# Patient Record
Sex: FEMALE
[De-identification: names, ages, dates, MRNs, and addresses within clinical notes are randomized per-mention and may not be internally consistent; named-entity substitution may affect disease eponyms.]

---

## 2018-03-21 ENCOUNTER — HOSPITAL ENCOUNTER (INPATIENT)
Dept: HOSPITAL 31 - C.ER | Age: 38
LOS: 4 days | Discharge: HOME | DRG: 871 | End: 2018-03-25
Attending: INTERNAL MEDICINE | Admitting: INTERNAL MEDICINE
Payer: COMMERCIAL

## 2018-03-21 DIAGNOSIS — G40.209: ICD-10-CM

## 2018-03-21 DIAGNOSIS — R79.89: ICD-10-CM

## 2018-03-21 DIAGNOSIS — E87.2: ICD-10-CM

## 2018-03-21 DIAGNOSIS — R65.20: ICD-10-CM

## 2018-03-21 DIAGNOSIS — A41.9: Primary | ICD-10-CM

## 2018-03-21 DIAGNOSIS — S42.251A: ICD-10-CM

## 2018-03-21 DIAGNOSIS — T36.1X5A: ICD-10-CM

## 2018-03-21 DIAGNOSIS — E87.1: ICD-10-CM

## 2018-03-21 DIAGNOSIS — S01.512A: ICD-10-CM
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
## 2018-03-21 DIAGNOSIS — J69.0: ICD-10-CM

## 2018-03-21 DIAGNOSIS — S43.014A: ICD-10-CM

## 2018-03-21 LAB
ALBUMIN SERPL-MCNC: 4.9 G/DL (ref 3.5–5)
ALBUMIN/GLOB SERPL: 1.1 {RATIO} (ref 1–2.1)
ALT SERPL-CCNC: 43 U/L (ref 9–52)
ARTERIAL BLOOD GAS HEMOGLOBIN: 10 G/DL (ref 11.7–17.4)
ARTERIAL BLOOD GAS O2 SAT: 99.5 % (ref 95–98)
ARTERIAL BLOOD GAS PCO2: 33 MM/HG (ref 35–45)
ARTERIAL BLOOD GAS TCO2: 14.2 MMOL/L (ref 22–28)
ARTERIAL PATENCY WRIST A: (no result)
AST SERPL-CCNC: 47 U/L (ref 14–36)
BACTERIA #/AREA URNS HPF: (no result) /[HPF]
BASOPHILS # BLD AUTO: 0.1 K/UL (ref 0–0.2)
BASOPHILS NFR BLD: 0.3 % (ref 0–2)
BILIRUB UR-MCNC: NEGATIVE MG/DL
BUN SERPL-MCNC: 6 MG/DL (ref 7–17)
BUN SERPL-MCNC: 8 MG/DL (ref 7–17)
CALCIUM SERPL-MCNC: 10.2 MG/DL (ref 8.6–10.4)
CALCIUM SERPL-MCNC: 8.4 MG/DL (ref 8.6–10.4)
EOSINOPHIL # BLD AUTO: 0.1 K/UL (ref 0–0.7)
EOSINOPHIL NFR BLD: 0.3 % (ref 0–4)
ERYTHROCYTE [DISTWIDTH] IN BLOOD BY AUTOMATED COUNT: 15 % (ref 11.5–14.5)
GFR NON-AFRICAN AMERICAN: > 60
GFR NON-AFRICAN AMERICAN: > 60
GLUCOSE UR STRIP-MCNC: (no result) MG/DL
GRAN CASTS #/AREA URNS LPF: 7 /LPF (ref 0–1)
HCG,QUALITATIVE URINE: NEGATIVE
HCO3 BLDA-SCNC: 14.3 MMOL/L (ref 21–28)
HGB BLD-MCNC: 13.7 G/DL (ref 11–16)
INHALED O2 CONCENTRATION: 100 %
LEUKOCYTE ESTERASE UR-ACNC: (no result) LEU/UL
LYMPHOCYTES # BLD AUTO: 5 K/UL (ref 1–4.3)
LYMPHOCYTES NFR BLD AUTO: 22.5 % (ref 20–40)
MCH RBC QN AUTO: 28.9 PG (ref 27–31)
MCHC RBC AUTO-ENTMCNC: 32 G/DL (ref 33–37)
MCV RBC AUTO: 90.5 FL (ref 81–99)
MONOCYTES # BLD: 1 K/UL (ref 0–0.8)
MONOCYTES NFR BLD: 4.5 % (ref 0–10)
NEUTROPHILS # BLD: 16 K/UL (ref 1.8–7)
NEUTROPHILS NFR BLD AUTO: 72.4 % (ref 50–75)
NRBC BLD AUTO-RTO: 0 % (ref 0–2)
OSMOLALITY,URINE: 403 MOSM/KG (ref 300–1000)
PH BLDA: 7.21 [PH] (ref 7.35–7.45)
PH UR STRIP: 5 [PH] (ref 5–8)
PLATELET # BLD: 326 K/UL (ref 130–400)
PMV BLD AUTO: 8.9 FL (ref 7.2–11.7)
PO2 BLDA: 277 MM/HG (ref 80–100)
PROT UR STRIP-MCNC: (no result) MG/DL
RBC # BLD AUTO: 4.74 MIL/UL (ref 3.8–5.2)
RBC # UR STRIP: (no result) /UL
SP GR UR STRIP: 1.01 (ref 1–1.03)
UROBILINOGEN UR-MCNC: NORMAL MG/DL (ref 0.2–1)
WBC # BLD AUTO: 22.1 K/UL (ref 4.8–10.8)

## 2018-03-21 PROCEDURE — 0RSJXZZ REPOSITION RIGHT SHOULDER JOINT, EXTERNAL APPROACH: ICD-10-PCS

## 2018-03-21 RX ADMIN — ENOXAPARIN SODIUM SCH MG: 40 INJECTION SUBCUTANEOUS at 16:18

## 2018-03-21 NOTE — CT
EXAM:

  CT Head Without Intravenous Contrast



EXAM DATE/TIME:

  3/21/2018 2:59 AM



CLINICAL HISTORY:

  37 years old, female; Pain; Other: Seizure



TECHNIQUE:

  Axial computed tomography images of the head/brain without intravenous 

contrast.  All CT scans at this facility use one or more dose reduction 

techniques, viz.: automated exposure control; ma/kV adjustment per patient size 

(including targeted exams where dose is matched to indication; i.e. head); or 

iterative reconstruction technique.

  Coronal and sagittal reformatted images were created and reviewed.



COMPARISON:

  No relevant prior studies available.



FINDINGS:

  LIMITATIONS:  Mild to moderate streak/motion artifact.

  BRAIN:  No definite acute abnormality identified.  No acute hemorrhage seen 

within the brain.  No acute extra-axial fluid collections visualized.  No 

evidence of significant mass effect within the brain. Normal gray-white matter 

differentiation.

  VENTRICLES:  No evidence of significant hydrocephalus.

  BONES/JOINTS:  No acute fractures or other acute bony abnormality noted.

  SOFT TISSUES:  No acute abnormality of the visualized soft tissues is seen.

  SINUSES:  Visualized paranasal sinuses appear clear.

  MASTOID AIR CELLS:  Mastoid air cells appear clear.



IMPRESSION:     

- No acute findings seen within the brain, allowing for motion artifact.

- See above for remaining findings.

## 2018-03-21 NOTE — CT
EXAM:

  CT Abdomen and Pelvis With Intravenous Contrast



EXAM DATE/TIME:

  3/21/2018 4:56 AM



CLINICAL HISTORY:

  37 years old, female; Pain; Abdominal pain; Additional info: Abd pain, 

vomiting



TECHNIQUE:

  Axial computed tomography images of the abdomen and pelvis with intravenous 

contrast.  All CT scans at this facility use one or more dose reduction 

techniques, viz.: automated exposure control; ma/kV adjustment per patient size 

(including targeted exams where dose is matched to indication; i.e. head); or 

iterative reconstruction technique.

  Coronal and sagittal reformatted images were created and reviewed.



CONTRAST:

  100 mL of [id number] administered intravenously.



COMPARISON:

  No relevant prior studies available.



FINDINGS:

  LIMITATIONS:  Mild streak/motion artifact.



 ABDOMEN:

  LIVER:  Fatty infiltration of the liver.

  GALLBLADDER AND BILE DUCTS:  No CT evidence of acute cholecystitis.  No 

evidence of significant biliary ductal dilatation.

  PANCREAS:  No CT evidence of acute pancreatitis.

  SPLEEN:  No acute abnormality of the spleen identified.

  ADRENALS:  No acute abnormality of the adrenal glands identified.

  KIDNEYS AND URETERS:  Left nephrogram has a subtle striated appearance.  No 

evidence of perinephric fluid collection.  No renal stones, hydronephrosis, or 

hydroureter seen.

  STOMACH AND BOWEL:  No acute abnormality of the stomach, small bowel or colon 

identified. No evidence of bowel obstruction.

  APPENDIX:  Appendix is seen, and is within normal limits in appearance.



 PELVIS:

  BLADDER: Small focus of air in the bladder lumen, presumably iatrogenic, such 

as related to recent bladder catheterization.  Recommend clinical correlation.

  REPRODUCTIVE:  Very small 1.6 cm lesion with a thin, enhancing and collapsed 

soft tissue rim in the left ovary. This has an appearance suggestive of a 

recently ruptured/involuting ovarian cyst, such as a corpus luteal cyst.  

Followup pelvic ultrasound as clinically indicated.  No acute abnormality of 

the uterus identified.



 ABDOMEN and PELVIS:

  INTRAPERITONEAL SPACE:  No evidence of free intraperitoneal air or fluid.

  BONES/JOINTS:  No acute fractures or other acute bony abnormality noted.

  SOFT TISSUES:  No acute abnormality of the visualized soft tissues is seen.

  VASCULATURE:  No evidence of abdominal aortic aneurysm. No evidence of 

periaortic hemorrhage.

  LYMPH NODES:  No evidence of diffuse lymphadenopathy.



IMPRESSION:     

- Striated appearance of the left nephrogram, a subtle finding, which could 

represent mild left pyelonephritis. Recommend clinical correlation.

- Otherwise, no evidence of significant acute process.

- See above for remaining findings.

## 2018-03-21 NOTE — RAD
PROCEDURE:  Radiographs of the Right Shoulder



HISTORY:

Pain, fall, deformity



COMPARISON:

No prior.



FINDINGS:



BONES:

There is an acute displaced comminuted fracture in the greater 

tuberosity of the humerus.



JOINTS:

There is near normal glenohumeral alignment. The acromioclavicular 

joint is normal.



SOFT TISSUES:

Normal.



OTHER FINDINGS:

None.



IMPRESSION:

Status post closed reduction, near normal glenohumeral alignment and 

acute comminuted displaced fracture in the greater tuberosity of the 

humerus with

## 2018-03-21 NOTE — CP.PCM.CON
History of Present Illness





- History of Present Illness


History of Present Illness: 





   37 yr old woman, right handed who had a seizure while delivering her daughter

, because, she says, it was a "breech" presentation, in [facility], originally 

residing in [facility], with no pmh, presented to the [facility] ER last night after 

several bouts of vomiting and gi upset. In the ER, the patient had a 

generalzied seizure, and had a prolonged postictal state with arm weakness. 

Patient states that before the seizure, there was no aura, no diaphoresis, and 

that she was started on antiepileptic medications in [place], but stopped taking 

it soon after delivery. She has a healthy 4 year old daughter, and no family 

history of epilepsy or pmh of febrile seizures. There is no history of epilepsy 

as a child, no meningitis, no encephalitis, no head trauma or prolonged period 

of loss of consciousness.  She is able to participate in physical exam and 

history. 





PMH/PSH: as above, IUTD, no other medical problems. 


FH/SH: , lives in [facility]. No tobacco, no etoh. 


All: nkda. 





on exam: 


Normal neurological examination except for left arm weakness. 








Past Patient History





- Past Social History


Smoking Status: Never Smoked





- CARDIAC


Hx Hypertension: No





- MUSCULOSKELETAL/RHEUMATOLOGICAL


Hx Falls: Yes





- GASTROINTESTINAL


Hx Gastritis: Yes





- PSYCHIATRIC


Hx Substance Use: No





- ANESTHESIA


Hx Anesthesia: Yes


Hx Anesthesia Reactions: No


Hx Malignant Hyperthermia: No





Meds


Allergies/Adverse Reactions: 


 Allergies











Allergy/AdvReac Type Severity Reaction Status Date / Time


 


No Known Allergies Allergy   Verified 03/21/18 02:59














- Medications


Medications: 


 Current Medications





Enoxaparin Sodium (Lovenox)  40 mg SC DAILY KARLI


Azithromycin 500 mg/ Sodium (Chloride)  250 mls @ 250 mls/hr IVPB DAILY KARLI


   PRN Reason: Protocol


   Last Admin: 03/21/18 10:44 Dose:  250 mls/hr


Ceftriaxone Sodium 2 gm/ (Sodium Chloride)  100 mls @ 100 mls/hr IVPB Q24H KARLI


   PRN Reason: Protocol


Sodium Chloride (Sodium Chloride 0.9%)  1,000 mls @ 150 mls/hr IV .Q6H40M KARLI


   Last Admin: 03/21/18 07:24 Dose:  150 mls/hr


Levetiracetam 500 mg/ Sodium (Chloride)  105 mls @ 420 mls/hr IVPB Q12H KARLI


Pantoprazole Sodium (Protonix Inj)  40 mg IVP DAILY KARLI


   Last Admin: 03/21/18 10:07 Dose:  40 mg











Results





- Vital Signs


Recent Vital Signs: 


 Last Vital Signs











Temp  98.6 F   03/21/18 12:00


 


Pulse  95 H  03/21/18 11:55


 


Resp  21   03/21/18 11:55


 


BP  104/69   03/21/18 11:55


 


Pulse Ox  100   03/21/18 11:55














- Labs


Result Diagrams: 


 03/21/18 03:16





 03/21/18 08:09


Labs: 


 Laboratory Results - last 24 hr











  03/21/18 03/21/18 03/21/18





  02:59 03:16 03:16


 


WBC   22.1 H 


 


RBC   4.74 


 


Hgb   13.7 


 


Hct   42.9 


 


MCV   90.5 


 


MCH   28.9 


 


MCHC   32.0 L 


 


RDW   15.0 H 


 


Plt Count   326 


 


MPV   8.9 


 


Neut % (Auto)   72.4 


 


Lymph % (Auto)   22.5 


 


Mono % (Auto)   4.5 


 


Eos % (Auto)   0.3 


 


Baso % (Auto)   0.3 


 


Neut # (Auto)   16.0 H 


 


Lymph # (Auto)   5.0 H 


 


Mono # (Auto)   1.0 H 


 


Eos # (Auto)   0.1 


 


Baso # (Auto)   0.1 


 


Puncture Site   


 


pCO2   


 


pO2   


 


HCO3   


 


ABG pH   


 


ABG Total CO2   


 


ABG O2 Saturation   


 


ABG Base Excess   


 


ABG Hemoglobin   


 


ABG Carboxyhemoglobin   


 


POC ABG HHb (Measured)   


 


ABG Methemoglobin   


 


Yaakov Test   


 


A-a O2 Difference   


 


Respiratory Index   


 


Hgb O2 Saturation   


 


Liter Flow   


 


FiO2   


 


Sodium    153 H


 


Potassium    7.1 H*


 


Chloride    113 H


 


Carbon Dioxide    17 L


 


Anion Gap    29 H


 


BUN    8


 


Creatinine    0.9


 


Est GFR ( Amer)    > 60


 


Est GFR (Non-Af Amer)    > 60


 


Random Glucose    260 H


 


Serum Osmolality   


 


Lactic Acid   


 


Calcium    10.2


 


Magnesium   


 


Total Bilirubin    0.7


 


AST    47 H


 


ALT    43


 


Alkaline Phosphatase    125


 


Total Protein    9.2 H


 


Albumin    4.9


 


Globulin    4.3 H


 


Albumin/Globulin Ratio    1.1


 


Urine Color  Straw  


 


Urine Clarity  Clear  


 


Urine pH  5.0  


 


Ur Specific Gravity  1.015  


 


Urine Protein  2+ H  


 


Urine Glucose (UA)  1+  


 


Urine Ketones  Negative  


 


Urine Blood  2+ H  


 


Urine Nitrate  Negative  


 


Urine Bilirubin  Negative  


 


Urine Urobilinogen  Normal  


 


Ur Leukocyte Esterase  Neg  


 


Urine WBC (Auto)  1  


 


Urine RBC (Auto)  15 H  


 


Urine Bacteria  Rare  


 


Granular Casts (Auto)  7  


 


Urine Osmolality   


 


Ur Random Creatinine   


 


Ur Random Sodium   


 


Urine HCG, Qual  Negative  


 


Salicylates   


 


Urine Opiates Screen   


 


Urine Methadone Screen   


 


Ur Barbiturates Screen   


 


Ur Phencyclidine Scrn   


 


Ur Amphetamines Screen   


 


U Benzodiazepines Scrn   


 


U Oth Cocaine Metabols   


 


U Cannabinoids Screen   


 


Alcohol, Quantitative    < 10














  03/21/18 03/21/18 03/21/18





  03:17 04:40 04:40


 


WBC   


 


RBC   


 


Hgb   


 


Hct   


 


MCV   


 


MCH   


 


MCHC   


 


RDW   


 


Plt Count   


 


MPV   


 


Neut % (Auto)   


 


Lymph % (Auto)   


 


Mono % (Auto)   


 


Eos % (Auto)   


 


Baso % (Auto)   


 


Neut # (Auto)   


 


Lymph # (Auto)   


 


Mono # (Auto)   


 


Eos # (Auto)   


 


Baso # (Auto)   


 


Puncture Site   


 


pCO2   


 


pO2   


 


HCO3   


 


ABG pH   


 


ABG Total CO2   


 


ABG O2 Saturation   


 


ABG Base Excess   


 


ABG Hemoglobin   


 


ABG Carboxyhemoglobin   


 


POC ABG HHb (Measured)   


 


ABG Methemoglobin   


 


Yaakov Test   


 


A-a O2 Difference   


 


Respiratory Index   


 


Hgb O2 Saturation   


 


Liter Flow   


 


FiO2   


 


Sodium   


 


Potassium   3.5 L 


 


Chloride   


 


Carbon Dioxide   


 


Anion Gap   


 


BUN   


 


Creatinine   


 


Est GFR ( Amer)   


 


Est GFR (Non-Af Amer)   


 


Random Glucose   


 


Serum Osmolality   


 


Lactic Acid    4.1 H*


 


Calcium   


 


Magnesium   2.3 


 


Total Bilirubin   


 


AST   


 


ALT   


 


Alkaline Phosphatase   


 


Total Protein   


 


Albumin   


 


Globulin   


 


Albumin/Globulin Ratio   


 


Urine Color   


 


Urine Clarity   


 


Urine pH   


 


Ur Specific Gravity   


 


Urine Protein   


 


Urine Glucose (UA)   


 


Urine Ketones   


 


Urine Blood   


 


Urine Nitrate   


 


Urine Bilirubin   


 


Urine Urobilinogen   


 


Ur Leukocyte Esterase   


 


Urine WBC (Auto)   


 


Urine RBC (Auto)   


 


Urine Bacteria   


 


Granular Casts (Auto)   


 


Urine Osmolality   


 


Ur Random Creatinine   


 


Ur Random Sodium   


 


Urine HCG, Qual   


 


Salicylates   


 


Urine Opiates Screen  Negative  


 


Urine Methadone Screen  No result  


 


Ur Barbiturates Screen  Negative  


 


Ur Phencyclidine Scrn  Negative  


 


Ur Amphetamines Screen  Negative  


 


U Benzodiazepines Scrn  Negative  


 


U Oth Cocaine Metabols  Negative  


 


U Cannabinoids Screen  Negative  


 


Alcohol, Quantitative   














  03/21/18 03/21/18 03/21/18





  04:50 07:57 08:09


 


WBC   


 


RBC   


 


Hgb   


 


Hct   


 


MCV   


 


MCH   


 


MCHC   


 


RDW   


 


Plt Count   


 


MPV   


 


Neut % (Auto)   


 


Lymph % (Auto)   


 


Mono % (Auto)   


 


Eos % (Auto)   


 


Baso % (Auto)   


 


Neut # (Auto)   


 


Lymph # (Auto)   


 


Mono # (Auto)   


 


Eos # (Auto)   


 


Baso # (Auto)   


 


Puncture Site  Lr  


 


pCO2  33 L  


 


pO2  277 H  


 


HCO3  14.3 L  


 


ABG pH  7.21 L  


 


ABG Total CO2  14.2 L  


 


ABG O2 Saturation  99.5 H  


 


ABG Base Excess  -13.6 L  


 


ABG Hemoglobin  10.0 L  


 


ABG Carboxyhemoglobin  1.1  


 


POC ABG HHb (Measured)  0.5  


 


ABG Methemoglobin  0.8  


 


Yaakov Test  Pos  


 


A-a O2 Difference  395.0  


 


Respiratory Index  1.4  


 


Hgb O2 Saturation  97.6  


 


Liter Flow  15.0  


 


FiO2  100.0  


 


Sodium   


 


Potassium   


 


Chloride   


 


Carbon Dioxide   


 


Anion Gap   


 


BUN   


 


Creatinine   


 


Est GFR ( Amer)   


 


Est GFR (Non-Af Amer)   


 


Random Glucose   


 


Serum Osmolality    292


 


Lactic Acid   


 


Calcium   


 


Magnesium   


 


Total Bilirubin   


 


AST   


 


ALT   


 


Alkaline Phosphatase   


 


Total Protein   


 


Albumin   


 


Globulin   


 


Albumin/Globulin Ratio   


 


Urine Color   


 


Urine Clarity   


 


Urine pH   


 


Ur Specific Gravity   


 


Urine Protein   


 


Urine Glucose (UA)   


 


Urine Ketones   


 


Urine Blood   


 


Urine Nitrate   


 


Urine Bilirubin   


 


Urine Urobilinogen   


 


Ur Leukocyte Esterase   


 


Urine WBC (Auto)   


 


Urine RBC (Auto)   


 


Urine Bacteria   


 


Granular Casts (Auto)   


 


Urine Osmolality   403 


 


Ur Random Creatinine   16.8 


 


Ur Random Sodium   164 


 


Urine HCG, Qual   


 


Salicylates   


 


Urine Opiates Screen   


 


Urine Methadone Screen   


 


Ur Barbiturates Screen   


 


Ur Phencyclidine Scrn   


 


Ur Amphetamines Screen   


 


U Benzodiazepines Scrn   


 


U Oth Cocaine Metabols   


 


U Cannabinoids Screen   


 


Alcohol, Quantitative   














  03/21/18 03/21/18 03/21/18





  08:09 08:09 08:11


 


WBC   


 


RBC   


 


Hgb   


 


Hct   


 


MCV   


 


MCH   


 


MCHC   


 


RDW   


 


Plt Count   


 


MPV   


 


Neut % (Auto)   


 


Lymph % (Auto)   


 


Mono % (Auto)   


 


Eos % (Auto)   


 


Baso % (Auto)   


 


Neut # (Auto)   


 


Lymph # (Auto)   


 


Mono # (Auto)   


 


Eos # (Auto)   


 


Baso # (Auto)   


 


Puncture Site   


 


pCO2   


 


pO2   


 


HCO3   


 


ABG pH   


 


ABG Total CO2   


 


ABG O2 Saturation   


 


ABG Base Excess   


 


ABG Hemoglobin   


 


ABG Carboxyhemoglobin   


 


POC ABG HHb (Measured)   


 


ABG Methemoglobin   


 


Yaakov Test   


 


A-a O2 Difference   


 


Respiratory Index   


 


Hgb O2 Saturation   


 


Liter Flow   


 


FiO2   


 


Sodium  141  


 


Potassium  4.3  


 


Chloride  110 H  


 


Carbon Dioxide  18 L  


 


Anion Gap  17  


 


BUN  6 L  


 


Creatinine  0.7  


 


Est GFR ( Amer)  > 60  


 


Est GFR (Non-Af Amer)  > 60  


 


Random Glucose  73  


 


Serum Osmolality   


 


Lactic Acid   2.6 H 


 


Calcium  8.4 L  


 


Magnesium   


 


Total Bilirubin   


 


AST   


 


ALT   


 


Alkaline Phosphatase   


 


Total Protein   


 


Albumin   


 


Globulin   


 


Albumin/Globulin Ratio   


 


Urine Color   


 


Urine Clarity   


 


Urine pH   


 


Ur Specific Gravity   


 


Urine Protein   


 


Urine Glucose (UA)   


 


Urine Ketones   


 


Urine Blood   


 


Urine Nitrate   


 


Urine Bilirubin   


 


Urine Urobilinogen   


 


Ur Leukocyte Esterase   


 


Urine WBC (Auto)   


 


Urine RBC (Auto)   


 


Urine Bacteria   


 


Granular Casts (Auto)   


 


Urine Osmolality   


 


Ur Random Creatinine   


 


Ur Random Sodium   


 


Urine HCG, Qual   


 


Salicylates    < 1.0


 


Urine Opiates Screen   


 


Urine Methadone Screen   


 


Ur Barbiturates Screen   


 


Ur Phencyclidine Scrn   


 


Ur Amphetamines Screen   


 


U Benzodiazepines Scrn   


 


U Oth Cocaine Metabols   


 


U Cannabinoids Screen   


 


Alcohol, Quantitative   














- Imaging and Cardiology


  ** CT scan - head


Status: Image reviewed by me (Ct head: normal. ), Pending (MRI Brain: per my 

reading, shows some left sided hippocampal atrophy, no intracranial lesions, no 

), Report reviewed by me (MRI Brain: shows ?left hippocampal sclerosis, 

official report pending, no lesions, no strokes. )





Assessment & Plan





- Assessment and Plan (Free Text)


Assessment: 





  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 

seizure last night. She does have some indication of sepsis, but the fact that 

she had a seizure during childbirth which is unusual unless she had preeclampsia

, (which she denies),hints that she needs daily epilepsy medication. I will 

load her with keppra and continue on daily dose, as well as EEG. 





Plan: 


1. Keppra 500 mg bid


2. EEG


3. Official MRI Brain report. 





Thank you for this interesting consult. 


Our team will follow. 


Dr. Marcano

## 2018-03-21 NOTE — C.PDOC
History Of Present Illness





<Quyen Steele - Last Filed: 03/21/18 06:22>





<Anne-Marie Lancaster - Last Filed: 03/21/18 06:54>


37 year old female is brought to the ED by EMS for evaluation of several 

episodes of vomiting, abdominal discomfort since yesterday. As per , 

patient was very weak, fell at home while trying to walk to the bathroom and 

hit her right shoulder PTA. Spouse is unsure of syncope or seizure activity at 

that time and denies observing pt hitting head on dresser. On arrival to the ED

, patient was observed having a grand mal seizure,  denies any prior 

history of seizures. Pt just arrived from [place] 1 week ago (Anne-Marie Lancaster

)





<Quyen Steele - Last Filed: 03/21/18 06:22>


History Per: Patient, Family


History/Exam Limitations: no limitations


Onset/Duration Of Symptoms: Hrs


Current Symptoms Are (Timing): Still Present


Location Of Pain/Discomfort: Diffuse


Radiation Of Pain To:: None


Quality Of Discomfort: "Pain"


Associated Symptoms: denies: Fever


Exacerbating Factors: None


Alleviating Factors: None


Recent travel outside of the [place]: No


Additional History Per: Patient


Abnormal Vaginal Bleeding: No





<Anne-Marie Lancaster - Last Filed: 03/21/18 06:54>


Chief Complaint (Nursing): Abdominal Pain





Past Medical History


Reviewed: Historical Data, Nursing Documentation, Vital Signs





- Medical History


PMH: Gastritis


   Denies: Diabetes, HTN


Surgical History: No Surg Hx


Family History: States: Unknown Family Hx





- Social History


Hx Alcohol Use: No


Hx Substance Use: No





<Anne-Marie Lancaster - Last Filed: 03/21/18 06:54>


Vital Signs: 


 Last Vital Signs











Temp  97.9 F   03/21/18 06:00


 


Pulse  106 H  03/21/18 05:40


 


Resp  20   03/21/18 05:40


 


BP  112/72   03/21/18 05:40


 


Pulse Ox  98   03/21/18 06:49














Review Of Systems


Constitutional: Negative for: Fever, Chills


Cardiovascular: Negative for: Chest Pain


Gastrointestinal: Positive for: Abdominal Pain


Genitourinary: Negative for: Dysuria


Skin: Negative for: Rash


Neurological: Negative for: Weakness, Numbness, Headache





<AnishaAnne-Marie - Last Filed: 03/21/18 06:54>





Physical Exam





- Physical Exam


Appears: Non-toxic, Other (Post ictal)


Skin: Normal Color, Warm, Dry


Head: Atraumatic, Normacephalic


Eye(s): bilateral: Normal Inspection, PERRL, EOMI


Nose: No Discharge


Oral Mucosa: Moist, No Drooling


Tongue: Other (Abrasion to the tip )


Lips: Abrasion (lower )


Neck: Normal ROM, No Midline Cervical Tenderness, Supple


Chest: Symmetrical


Cardiovascular: Rhythm Regular, No Murmur


Respiratory: Normal Breath Sounds, No Rales, No Rhonchi, No Wheezing


Gastrointestinal/Abdominal: Soft, No Tenderness, No Guarding, No Rebound


Extremity: Normal ROM, Tenderness (right anterior shoulder over clavicular area)

, Capillary Refill (< 2 seconds), Deformity, No Swelling


Extremity: Bilateral: Normal Color And Temperature


Pulses: Left Radial: Normal, Right Radial: Normal


Neurological/Psych: Oriented x3, Normal Motor, Normal Sensation


Gait: Steady





<Anne-Marie Lancaster - Last Filed: 03/21/18 06:54>





ED Course And Treatment





- Laboratory Results


Result Diagrams: 


 03/21/18 03:16





 03/21/18 04:40





<Quyen tSeele - Last Filed: 03/21/18 06:22>





- Laboratory Results


Result Diagrams: 


 03/21/18 03:16





 03/21/18 04:40


ECG: Interpreted By Me, Viewed By Me (and Dr Steele)


ECG Rhythm: Sinus Tachycardia (nonspecific ST changes, no ectopy)


O2 Sat by Pulse Oximetry: 98 (On RA)


Pulse Ox Interpretation: Normal





- Radiology


CXR: Interpreted by Me, Viewed By Me


CXR Interpretation: Yes: Infiltrates (right middle lobe)





- Other Rad


  ** Right Shoulder X-Ray


X-Ray: Interpreted by Me, Viewed By Me


Interpretation: Anterior dislocation and glenoid fracture





- CT Scan/US


  ** CT head


Other Rad Studies (CT/US): Read By Radiologist, Radiology Report Reviewed


CT/US Interpretation: EXAM:  CT Head Without Intravenous Contrast.  EXAM DATE/

TIME:  3/21/2018 2:59 AM.  CLINICAL HISTORY:  37 years old, female; Pain; Other

: Seizure.  TECHNIQUE:  Axial computed tomography images of the head/brain 

without intravenous contrast. All CT scans at.  this facility use one or more 

dose reduction techniques, viz.: automated exposure control; ma/kV.  adjustment 

per patient size (including targeted exams where dose is matched to indication; 

i.e. head);.  or iterative reconstruction technique.  Coronal and sagittal 

reformatted images were created and reviewed.  COMPARISON:  No relevant prior 

studies available.  FINDINGS:  LIMITATIONS: Mild to moderate streak/motion 

artifact.  BRAIN: No definite acute abnormality identified. No acute hemorrhage 

seen within the brain. No.  acute extra-axial fluid collections visualized. No 

evidence of significant mass effect within the brain.  Normal gray-white matter 

differentiation.  VENTRICLES: No evidence of significant hydrocephalus.  BONES/

JOINTS: No acute fractures or other acute bony abnormality noted.  SOFT TISSUES

: No acute abnormality of the visualized soft tissues is seen.  SINUSES: 

Visualized paranasal sinuses appear clear.MASTOID AIR CELLS: Mastoid air cells 

appear clear.  IMPRESSION:  - No acute findings seen within the brain, allowing 

for motion artifact.  - See above for remaining findings.  Thank you for 

allowing us to participate in the care of your patient.  Dictated and 

Authenticated by: Marjorie Ordaz MD.  03/21/2018 5:21 AM Eastern Time ([place] & 

Unique)


Progress Note: Plan:  - CT head.  - EKG.  - Labs.  - Ativan 2 mg IVP.  - 

Dilaudid 1 mg PO.  - Morphine 2 mg IVP.  - IV fluids.  - Zofran 4 mg IVP.  - 

Right shoulder X-Ray.  - UA.  Shoulder reduction performed by Dr Steele 

successfully- see procedure note.  05:43 - Dr Steele spoke with Dr. Feliz 

about the case for ICU eval / admission.  06:05 - spoke with Dr. Cameron who 

accepted the patient under his service.  Chest/ abd/ pelvis CT IV ordered, IV 

abx initiated





<Anne-Marie Lancaster - Last Filed: 03/21/18 06:54>





Medical Decision Making





<Quyen Steele - Last Filed: 03/21/18 06:22>





<Anne-Marie Lancaster - Last Filed: 03/21/18 06:54>


Medical Decision Making: 


Performed by Dr. Steele





Films were reviewed and shown anterior shoulder dislocation and glenoid 

fracture. With 's consent patient was given 100 mg propofol. Using axial 

traction shoulder was reduced.





Post reduction X-Ray showed good position, no complications during the process 

conscious sedation, shoulder was placed on immobilizer. (Anne-Marie Lancaster)





Disposition





- Disposition


Disposition Time: 06:25





<Quyen Steele - Last Filed: 03/21/18 06:22>





<Anne-Marie Lancaster - Last Filed: 03/21/18 06:54>





- Disposition


Disposition: HOSPITALIZED


Condition: SERIOUS





- Clinical Impression


Clinical Impression: 


 Sepsis, Pneumonia, Seizures, Altered mental status, Shoulder dislocation








<Quyen Steele - Last Filed: 03/21/18 06:22>





- PA / NP / Resident Statement


MD/DO has reviewed & agrees with the documentation as recorded.





- Scribe Statement


The provider has reviewed the documentation as recorded by the Scribe





<Anne-Marie Lancaster - Last Filed: 03/21/18 06:54>





- Scribe Statement


Can [place]





All medical record entries made by the Scribe were at my direction and 

personally dictated by me. I have reviewed the chart and agree that the record 

accurately reflects my personal performance of the history, physical exam, 

medical decision making, and the department course for this patient. I have 

also personally directed, reviewed, and agree with the discharge instructions 

and disposition. (Anne-Marie Lancaster)





Proc Sedation INTRA-PROCEDURE





<Quyen Steele - Last Filed: 03/21/18 06:22>





<Anne-Marie Lancaster - Last Filed: 03/21/18 06:54>





- Medications


Medications Given: 








Azithromycin 500 mg/ Sodium (Chloride)  250 mls @ 250 mls/hr IVPB DAILY KARLI


   PRN Reason: Protocol


Ceftriaxone Sodium 2 gm/ (Sodium Chloride)  100 mls @ 100 mls/hr IVPB DAILY KARLI


   PRN Reason: Protocol





Discontinued Medications





Calcium Chloride (Calcium Chloride)  1,000 mg IV ONCE ONE


   Stop: 03/21/18 04:28


   Last Admin: 03/21/18 05:14  Dose: 1,000 mg





eMAR Start Stop


 Document     03/21/18 05:14  SAIMA  (Rec: 03/21/18 05:14  SAIMA  [id number])


     Intravenous Solution


      Start Date                                 03/21/18


      Start Time                                 05:13


      End Date                                   03/21/18


      End time                                   05:15


      Total Infusion Time                        2





Dextrose (Dextrose 50% Inj)  50 ml IVP STAT STA


   Stop: 03/21/18 04:31


   Last Admin: 03/21/18 05:26  Dose: 50 ml





IVP Administration


 Document     03/21/18 05:26  SAIMA  (Rec: 03/21/18 05:27  SAIMA  [id number])


     Charges for Administration


      # of IVP Administrations                   1





Hydromorphone HCl (Dilaudid)  1 mg IVP STAT STA


   Stop: 03/21/18 03:13


   Last Admin: 03/21/18 03:15  Dose: 1 mg





MAR Pain Assessment


 Document     03/21/18 03:15  SAIMA  (Rec: 03/21/18 04:34  SAIMA  [id number])


     Pain Reassessment


      Is this a pain reassessment?               Yes


     Sleep


      Is patient sleeping during reassessment?   No


     Presence of Pain


      Presence of Pain                           Yes


     Pain Scale Used


      Pain Scale Used                            Numeric


     Location


      Left, Right or Bilateral                   Right


      Pain Location Body Site                    Shoulder


     Description


      Description                                Constant


      Pain Behavior                              Screaming


IVP Administration


 Document     03/21/18 03:15  SAIMA  (Rec: 03/21/18 04:34  SAIMA  [id number])


     Charges for Administration


      # of IVP Administrations                   1





Sodium Chloride (Sodium Chloride 0.9%)  1,000 mls @ 1,000 mls/hr IV .Q1H ONE


   Stop: 03/21/18 03:58


   Last Admin: 03/21/18 03:03  Dose: 1,000 mls/hr





eMAR Start Stop


 Document     03/21/18 03:03  BAL  (Rec: 03/21/18 03:03  BAL  [id number])


     Intravenous Solution


      Start Date                                 03/21/18


      Start Time                                 03:03


      End Date                                   03/21/18


      End time                                   04:03


      Total Infusion Time                        60





Sodium Chloride (Sodium Chloride 0.9%)  500 mls @ 999 mls/hr IV .Q31M STA


   Stop: 03/21/18 04:56


   Last Admin: 03/21/18 05:13  Dose: 999 mls/hr





eMAR Start Stop


 Document     03/21/18 05:13  SAIMA  (Rec: 03/21/18 05:14  SAIMA  [id number])


     Intravenous Solution


      Start Date                                 03/21/18


      Start Time                                 05:13


      End Date                                   03/21/18


      End time                                   05:45


      Total Infusion Time                        32





Cefoxitin Sodium 2 gm/ Sodium (Chloride)  150 mls @ 150 mls/hr IV STAT STA


   PRN Reason: Protocol


   Stop: 03/21/18 06:34


Sodium Chloride (Sodium Chloride 0.9%)  500 mls @ 999 mls/hr IV .Q31M STA


   Stop: 03/21/18 06:11


   Last Admin: 03/21/18 05:49  Dose: 999 mls/hr





eMAR Start Stop


 Document     03/21/18 05:49  SAIMA  (Rec: 03/21/18 05:49  SAIMA  [id number])


     Intravenous Solution


      Start Date                                 03/21/18


      Start Time                                 05:49


      End Date                                   03/21/18


      End time                                   06:20


      Total Infusion Time                        31





Insulin Human Regular (Novolin R)  10 unit IV ONCE ONE


   Stop: 03/21/18 04:30


   Last Admin: 03/21/18 05:24  Dose: 10 unit





eMAR Start Stop


 Document     03/21/18 05:24  SAIMA  (Rec: 03/21/18 05:26  SAIMA  [id number])


     Intravenous Solution


      Start Date                                 03/21/18


      Start Time                                 05:10


      End Date                                   03/21/18


      End time                                   05:10


      Total Infusion Time                        0





Lorazepam (Ativan)  2 mg IVP ONCE ONE


   Stop: 03/21/18 03:01


   Last Admin: 03/21/18 02:50  Dose: 2 mg





IVP Administration


 Document     03/21/18 02:50  BAL  (Rec: 03/21/18 03:04  BAL  [id number])


     Charges for Administration


      # of IVP Administrations                   1





Morphine Sulfate (Morphine)  2 mg IV ONCE ONE


   Stop: 03/21/18 03:06


   Last Admin: 03/21/18 02:55  Dose: 2 mg





eMAR Start Stop


 Document     03/21/18 02:55  SAIMA  (Rec: 03/21/18 04:35  SAIMA  [id number])


     Intravenous Solution


      Start Date                                 03/21/18


      Start Time                                 02:55


MAR Pain Assessment


 Document     03/21/18 02:55  SAIMA  (Rec: 03/21/18 04:35  SAIMA  [id number])


     Pain Reassessment


      Is this a pain reassessment?               No


     Sleep


      Is patient sleeping during reassessment?   No


     Presence of Pain


      Presence of Pain                           Yes


     Pain Scale Used


      Pain Scale Used                            Numeric


     Location


      Left, Right or Bilateral                   Right


      Pain Location Body Site                    Shoulder


     Description


      Description                                Constant


      Intensity of Pain at present               10


      Pain Behavior                              Screaming





Ondansetron HCl (Zofran Inj)  4 mg IVP ONCE ONE


   Stop: 03/21/18 03:01


   Last Admin: 03/21/18 03:04  Dose: 4 mg





IVP Administration


 Document     03/21/18 03:04  BAL  (Rec: 03/21/18 03:04  BAL  [id number])


     Charges for Administration


      # of IVP Administrations                   1





Propofol (Diprivan)  100 mg IVP ONCE ONE


   Stop: 03/21/18 03:48


   Last Admin: 03/21/18 03:47  Dose: 100 mg





IVP Administration


 Document     03/21/18 03:47  SAIMA  (Rec: 03/21/18 05:13  SAIMA  [id number])


     Charges for Administration


      # of IVP Administrations                   2





Sodium Bicarbonate (Sodium Bicarbonate (8.4%) 50 Meq Syringe)  50 meq IVP ONCE 

ONE


   Stop: 03/21/18 04:28


   Last Admin: 03/21/18 05:23  Dose: 50 meq





IVP Administration


 Document     03/21/18 05:23  SAIMA  (Rec: 03/21/18 05:23  SAIMA  [id number])


     Charges for Administration


      # of IVP Administrations                   1














Proc Sedation PRE-PROCEDURE





<Quyen Steele - Last Filed: 03/21/18 06:22>





- Pre-Anesthesia


Past Medical History: Medications Reviewed, Allergies Reviewed, Record Review


Previous Surgies: Reviewed


Family History/Social History: Reviewed





- Physical Exam/Review of Systems


Vital Signs Reviewed: Yes


Cardiovascular: Regular Rate and Rhythm, Normal S1, S2.  denies: Murmurs


Respiratory/Chest: Clear to Auscultation, Good Air Exchange.  denies: 

Respiratory Distress, Accessory Muscle Use


Neurological: GCS=15, CN II-XII Intact, Speech Normal


Abdomen: Normal Bowel Sounds.  denies: Tenderness, Distention, Peritoneal Signs


Mental Status: Alert and Oriented X 3





- Pre-Procedure Airway Assessment


History of difficult intubation or surgical airway(i.e trach: No


Inability to extend neck:: No


Mouth opening less than two finger breadth:: No


Diagnosis of sleep apnea:: No


Less than three finger breadth to hyoid bone:: No


ASA Criteria: 1 - Healthy, normal.  2 - Mild systemic disease (No functional 

limitations, mildline obesity, DM withot complications, Hypertention).  3 - 

Severe systemic disease (Some functional limitation, stable angina, morbid 

obesity, controlled COPD/Asthma/CHF).  4 - Sever systemic disease constant 

threat to life (Unstable angina, active symptoms of COPD/Asthma, CHF/

Hypertension.  5 - Moribund





<Anne-Marie Lancaster - Last Filed: 03/21/18 06:54>





- Pre-Anesthesia


Chief Complaint: Abdominal Pain





Proc Sedation POST-PROCEDURE





<Quyen Steele - Last Filed: 03/21/18 06:22>





- Discharge Checklist


Written MD order for Discharge: Yes


Vital signs assessed and are consistent with pre-procedure r: Yes


Ambulates to pre-procedural level: Yes


Alert and oriented to pre-procedural level: Yes


Responsible adult escort present: Yes


DISCHARGE INSTRUCTIONS GIVEN:: Yes





<Anne-Marie Lancaster - Last Filed: 03/21/18 06:54>





- Post Procedure Physician Note


Post Procedure Note: 





Films were reviewed and shown anterior shoulder dislocation and glenoid 

fracture. With 's consent patient was given 100 mg propofol. Using axial 

traction shoulder was reduced.





Post reduction X-Ray showed good position, no complications during the process 

conscious sedation, shoulder was placed on immobilizer. (Anne-Marie Lancaster)

## 2018-03-21 NOTE — MRI
PROCEDURE:  MRI BRAIN WITH AND WITHOUT CONTRAST



HISTORY:

s/p seizure



COMPARISON:

Noncontrast head CT from 03/21/2018 



TECHNIQUE:

Multiplanar, multisequence MR images of the brain were obtained with 

and without intravenous contrast enhancement. 15 mL Omniscan was 

injected intravenously. 



FINDINGS:



HEMORRHAGE:

None



DWI:

No evidence of an acute or early subacute infarction.



BRAIN PARENCHYMA:

Gray-white matter differentiation is preserved.  There is no mass, 

mass effect or abnormal extra-axial fluid collection.  There is no 

territorial infarction. The midline sagittal structures are normal.



ENHANCEMENT:

No abnormal intracranial enhancement.



VENTRICLES:

The ventricles are normal in size, shape and configuration.



CRANIUM:

There is normal bone marrow signal pattern.



ORBITS:

Grossly unremarkable.



PARANASAL SINUSES/MASTOIDS:

Predominantly clear.



VASCULAR SYSTEM:

There are normal signal voids in the larger intracranial arteries.



OTHER FINDINGS:

None .



IMPRESSION:

Normal pre and post contrast enhanced MRI of the brain.

## 2018-03-21 NOTE — CP.PCM.HP
<RamboChari L. - Last Filed: 03/21/18 07:04>





History of Present Illness





- History of Present Illness


History of Present Illness: 


CC: vomiting, shaking, shoulder injury





HPI: Patient is a 37 year old female with no significant past medical history 

who moved to the  from [facility] one week ago and who was brought in today for 

vomiting and shaking. Patient had dinner with her  and then started 

having abdominal pain which she thought was gas pain. Patient took a medication 

for gas and about 30 minutes later had 3 episodes of non bloody vomiting. 

Patient was feeling very weak and tired. Patient fell onto a table (dislocating 

her right shoulder) and  noticed some blood from the patient's mouth. At 

that time patient's  tried to open her mouth but her jaw was locked 

close. The patient started shaking for about 3 minutes. 911 was called and 

patient brought to [facility]. As per nurse, patient was screaming in the 

ER in pain due to her right shoulder dislocation. After a few minutes of 

screaming, patient's eyes became fixed and her lower extremities became rigid 

and she started convulsing for about 1 minute. Patient given Ativan and then 

sedated to reduce her shoulder. On exam patient is lethargic. Patient denies 

any recent illnesses. 





PMHx: denies


Psurg: c section in 2014


Famhx: denies


Socialhx: denies tobacco, alcohol, drugs. Patient moved to [facility] one week ago. 


Allergies: NKDA


Home meds: none








Present on Admission





- Present on Admission


Any Indicators Present on Admission: No


History of DVT/PE: No


History of Uncontrolled Diabetes: No


Urinary Catheter: No


Decubitus Ulcer Present: No





Review of Systems





- Review of Systems


Systems not reviewed;Unavailable: Language Barrier





- Gastrointestinal


Gastrointestinal: Abdominal Pain, Nausea, Vomiting





Past Patient History





- Past Social History


Smoking Status: Never Smoked





- CARDIAC


Hx Hypertension: No





- GASTROINTESTINAL


Hx Gastritis: Yes





- PSYCHIATRIC


Hx Substance Use: No





Meds


Allergies/Adverse Reactions: 


 Allergies











Allergy/AdvReac Type Severity Reaction Status Date / Time


 


No Known Allergies Allergy   Verified 03/21/18 02:59














Physical Exam





- Constitutional


Appears: Toxic, No Acute Distress





- Head Exam


Head Exam: ATRAUMATIC, NORMAL INSPECTION, NORMOCEPHALIC





- Eye Exam


Eye Exam: EOMI, Normal appearance





- ENT Exam


ENT Exam: Mucous Membranes Moist





- Respiratory Exam


Respiratory Exam: Clear to Auscultation Bilateral, NORMAL BREATHING PATTERN





- Cardiovascular Exam


Cardiovascular Exam: Tachycardia, REGULAR RHYTHM, +S1, +S2





- GI/Abdominal Exam


GI & Abdominal Exam: Normal Bowel Sounds, Soft.  absent: Tenderness





- Extremities Exam


Extremities exam: Positive for: normal inspection.  Negative for: pedal edema


Additional comments: 





right arm in sling 





- Neurological Exam


Neurological exam: Alert, Oriented x3





- Psychiatric Exam


Psychiatric exam: Normal Affect, Normal Mood





- Skin


Skin Exam: Intact, Normal Color, Warm





Results





- Vital Signs


Recent Vital Signs: 





 Last Vital Signs











Temp  97.9 F   03/21/18 06:00


 


Pulse  106 H  03/21/18 05:40


 


Resp  20   03/21/18 05:40


 


BP  112/72   03/21/18 05:40


 


Pulse Ox  98   03/21/18 06:53














- Labs


Result Diagrams: 


 03/21/18 03:16





 03/21/18 04:40


Labs: 





 Laboratory Results - last 24 hr











  03/21/18 03/21/18 03/21/18





  02:59 03:16 03:16


 


WBC   22.1 H 


 


RBC   4.74 


 


Hgb   13.7 


 


Hct   42.9 


 


MCV   90.5 


 


MCH   28.9 


 


MCHC   32.0 L 


 


RDW   15.0 H 


 


Plt Count   326 


 


MPV   8.9 


 


Neut % (Auto)   72.4 


 


Lymph % (Auto)   22.5 


 


Mono % (Auto)   4.5 


 


Eos % (Auto)   0.3 


 


Baso % (Auto)   0.3 


 


Neut # (Auto)   16.0 H 


 


Lymph # (Auto)   5.0 H 


 


Mono # (Auto)   1.0 H 


 


Eos # (Auto)   0.1 


 


Baso # (Auto)   0.1 


 


Puncture Site   


 


pCO2   


 


pO2   


 


HCO3   


 


ABG pH   


 


ABG Total CO2   


 


ABG O2 Saturation   


 


ABG Base Excess   


 


ABG Hemoglobin   


 


ABG Carboxyhemoglobin   


 


POC ABG HHb (Measured)   


 


ABG Methemoglobin   


 


Yaakov Test   


 


A-a O2 Difference   


 


Respiratory Index   


 


Hgb O2 Saturation   


 


Liter Flow   


 


FiO2   


 


Sodium    153 H


 


Potassium    7.1 H*


 


Chloride    113 H


 


Carbon Dioxide    17 L


 


Anion Gap    29 H


 


BUN    8


 


Creatinine    0.9


 


Est GFR ( Amer)    > 60


 


Est GFR (Non-Af Amer)    > 60


 


Random Glucose    260 H


 


Lactic Acid   


 


Calcium    10.2


 


Magnesium   


 


Total Bilirubin    0.7


 


AST    47 H


 


ALT    43


 


Alkaline Phosphatase    125


 


Total Protein    9.2 H


 


Albumin    4.9


 


Globulin    4.3 H


 


Albumin/Globulin Ratio    1.1


 


Urine Color  Straw  


 


Urine Clarity  Clear  


 


Urine pH  5.0  


 


Ur Specific Gravity  1.015  


 


Urine Protein  2+ H  


 


Urine Glucose (UA)  1+  


 


Urine Ketones  Negative  


 


Urine Blood  2+ H  


 


Urine Nitrate  Negative  


 


Urine Bilirubin  Negative  


 


Urine Urobilinogen  Normal  


 


Ur Leukocyte Esterase  Neg  


 


Urine WBC (Auto)  1  


 


Urine RBC (Auto)  15 H  


 


Urine Bacteria  Rare  


 


Granular Casts (Auto)  7  


 


Urine HCG, Qual  Negative  


 


Urine Opiates Screen   


 


Urine Methadone Screen   


 


Ur Barbiturates Screen   


 


Ur Phencyclidine Scrn   


 


Ur Amphetamines Screen   


 


U Benzodiazepines Scrn   


 


U Oth Cocaine Metabols   


 


U Cannabinoids Screen   


 


Alcohol, Quantitative    < 10














  03/21/18 03/21/18 03/21/18





  03:17 04:40 04:40


 


WBC   


 


RBC   


 


Hgb   


 


Hct   


 


MCV   


 


MCH   


 


MCHC   


 


RDW   


 


Plt Count   


 


MPV   


 


Neut % (Auto)   


 


Lymph % (Auto)   


 


Mono % (Auto)   


 


Eos % (Auto)   


 


Baso % (Auto)   


 


Neut # (Auto)   


 


Lymph # (Auto)   


 


Mono # (Auto)   


 


Eos # (Auto)   


 


Baso # (Auto)   


 


Puncture Site   


 


pCO2   


 


pO2   


 


HCO3   


 


ABG pH   


 


ABG Total CO2   


 


ABG O2 Saturation   


 


ABG Base Excess   


 


ABG Hemoglobin   


 


ABG Carboxyhemoglobin   


 


POC ABG HHb (Measured)   


 


ABG Methemoglobin   


 


Yaakov Test   


 


A-a O2 Difference   


 


Respiratory Index   


 


Hgb O2 Saturation   


 


Liter Flow   


 


FiO2   


 


Sodium   


 


Potassium   3.5 L 


 


Chloride   


 


Carbon Dioxide   


 


Anion Gap   


 


BUN   


 


Creatinine   


 


Est GFR ( Amer)   


 


Est GFR (Non-Af Amer)   


 


Random Glucose   


 


Lactic Acid    4.1 H*


 


Calcium   


 


Magnesium   2.3 


 


Total Bilirubin   


 


AST   


 


ALT   


 


Alkaline Phosphatase   


 


Total Protein   


 


Albumin   


 


Globulin   


 


Albumin/Globulin Ratio   


 


Urine Color   


 


Urine Clarity   


 


Urine pH   


 


Ur Specific Gravity   


 


Urine Protein   


 


Urine Glucose (UA)   


 


Urine Ketones   


 


Urine Blood   


 


Urine Nitrate   


 


Urine Bilirubin   


 


Urine Urobilinogen   


 


Ur Leukocyte Esterase   


 


Urine WBC (Auto)   


 


Urine RBC (Auto)   


 


Urine Bacteria   


 


Granular Casts (Auto)   


 


Urine HCG, Qual   


 


Urine Opiates Screen  Negative  


 


Urine Methadone Screen  No result  


 


Ur Barbiturates Screen  Negative  


 


Ur Phencyclidine Scrn  Negative  


 


Ur Amphetamines Screen  Negative  


 


U Benzodiazepines Scrn  Negative  


 


U Oth Cocaine Metabols  Negative  


 


U Cannabinoids Screen  Negative  


 


Alcohol, Quantitative   














  03/21/18





  04:50


 


WBC 


 


RBC 


 


Hgb 


 


Hct 


 


MCV 


 


MCH 


 


MCHC 


 


RDW 


 


Plt Count 


 


MPV 


 


Neut % (Auto) 


 


Lymph % (Auto) 


 


Mono % (Auto) 


 


Eos % (Auto) 


 


Baso % (Auto) 


 


Neut # (Auto) 


 


Lymph # (Auto) 


 


Mono # (Auto) 


 


Eos # (Auto) 


 


Baso # (Auto) 


 


Puncture Site  Lr


 


pCO2  33 L


 


pO2  277 H


 


HCO3  14.3 L


 


ABG pH  7.21 L


 


ABG Total CO2  14.2 L


 


ABG O2 Saturation  99.5 H


 


ABG Base Excess  -13.6 L


 


ABG Hemoglobin  10.0 L


 


ABG Carboxyhemoglobin  1.1


 


POC ABG HHb (Measured)  0.5


 


ABG Methemoglobin  0.8


 


Yaakov Test  Pos


 


A-a O2 Difference  395.0


 


Respiratory Index  1.4


 


Hgb O2 Saturation  97.6


 


Liter Flow  15.0


 


FiO2  100.0


 


Sodium 


 


Potassium 


 


Chloride 


 


Carbon Dioxide 


 


Anion Gap 


 


BUN 


 


Creatinine 


 


Est GFR ( Amer) 


 


Est GFR (Non-Af Amer) 


 


Random Glucose 


 


Lactic Acid 


 


Calcium 


 


Magnesium 


 


Total Bilirubin 


 


AST 


 


ALT 


 


Alkaline Phosphatase 


 


Total Protein 


 


Albumin 


 


Globulin 


 


Albumin/Globulin Ratio 


 


Urine Color 


 


Urine Clarity 


 


Urine pH 


 


Ur Specific Gravity 


 


Urine Protein 


 


Urine Glucose (UA) 


 


Urine Ketones 


 


Urine Blood 


 


Urine Nitrate 


 


Urine Bilirubin 


 


Urine Urobilinogen 


 


Ur Leukocyte Esterase 


 


Urine WBC (Auto) 


 


Urine RBC (Auto) 


 


Urine Bacteria 


 


Granular Casts (Auto) 


 


Urine HCG, Qual 


 


Urine Opiates Screen 


 


Urine Methadone Screen 


 


Ur Barbiturates Screen 


 


Ur Phencyclidine Scrn 


 


Ur Amphetamines Screen 


 


U Benzodiazepines Scrn 


 


U Oth Cocaine Metabols 


 


U Cannabinoids Screen 


 


Alcohol, Quantitative 














Assessment & Plan





- Assessment and Plan (Free Text)


Assessment: 


Sepsis


admit to ICU


2/2 pneumonia


Lactic acid: 4.1


f/u blood cultures


Azithromycin 500mg daily


Ceftriaxone 2mg daily





s/p Seizure


secondary to metabolic changes


monitor closely 


given Ativan 2mg ivp in ED


CT head: no acute findings seen


f/u MRI brain w and w/out contrast


f/u EEG


Neuro consulted, Dr. Marcano, help appreciated 





RLL pneumonia 


f/u cxray and chest CT


Azithromycin 500mg daily


Ceftriaxone 2mg daily





R Shoulder Dislocation


reduced in ED


f/u shoulder xray 





Prophylaxis


Protonix 40mg ivp daily


Lovenox 40mg sc daily


NS @ 150cc/hr 





<Anjel Cameron P - Last Filed: 03/21/18 10:52>





Results





- Vital Signs


Recent Vital Signs: 





 Last Vital Signs











Temp  97.9 F   03/21/18 06:00


 


Pulse  96 H  03/21/18 08:45


 


Resp  16   03/21/18 08:45


 


BP  112/72   03/21/18 05:40


 


Pulse Ox  99   03/21/18 08:45














- Labs


Result Diagrams: 


 03/21/18 03:16





 03/21/18 08:09


Labs: 





 Laboratory Results - last 24 hr











  03/21/18 03/21/18 03/21/18





  02:59 03:16 03:16


 


WBC   22.1 H 


 


RBC   4.74 


 


Hgb   13.7 


 


Hct   42.9 


 


MCV   90.5 


 


MCH   28.9 


 


MCHC   32.0 L 


 


RDW   15.0 H 


 


Plt Count   326 


 


MPV   8.9 


 


Neut % (Auto)   72.4 


 


Lymph % (Auto)   22.5 


 


Mono % (Auto)   4.5 


 


Eos % (Auto)   0.3 


 


Baso % (Auto)   0.3 


 


Neut # (Auto)   16.0 H 


 


Lymph # (Auto)   5.0 H 


 


Mono # (Auto)   1.0 H 


 


Eos # (Auto)   0.1 


 


Baso # (Auto)   0.1 


 


Puncture Site   


 


pCO2   


 


pO2   


 


HCO3   


 


ABG pH   


 


ABG Total CO2   


 


ABG O2 Saturation   


 


ABG Base Excess   


 


ABG Hemoglobin   


 


ABG Carboxyhemoglobin   


 


POC ABG HHb (Measured)   


 


ABG Methemoglobin   


 


Yaakov Test   


 


A-a O2 Difference   


 


Respiratory Index   


 


Hgb O2 Saturation   


 


Liter Flow   


 


FiO2   


 


Sodium    153 H


 


Potassium    7.1 H*


 


Chloride    113 H


 


Carbon Dioxide    17 L


 


Anion Gap    29 H


 


BUN    8


 


Creatinine    0.9


 


Est GFR ( Amer)    > 60


 


Est GFR (Non-Af Amer)    > 60


 


Random Glucose    260 H


 


Serum Osmolality   


 


Lactic Acid   


 


Calcium    10.2


 


Magnesium   


 


Total Bilirubin    0.7


 


AST    47 H


 


ALT    43


 


Alkaline Phosphatase    125


 


Total Protein    9.2 H


 


Albumin    4.9


 


Globulin    4.3 H


 


Albumin/Globulin Ratio    1.1


 


Urine Color  Straw  


 


Urine Clarity  Clear  


 


Urine pH  5.0  


 


Ur Specific Gravity  1.015  


 


Urine Protein  2+ H  


 


Urine Glucose (UA)  1+  


 


Urine Ketones  Negative  


 


Urine Blood  2+ H  


 


Urine Nitrate  Negative  


 


Urine Bilirubin  Negative  


 


Urine Urobilinogen  Normal  


 


Ur Leukocyte Esterase  Neg  


 


Urine WBC (Auto)  1  


 


Urine RBC (Auto)  15 H  


 


Urine Bacteria  Rare  


 


Granular Casts (Auto)  7  


 


Urine Osmolality   


 


Ur Random Creatinine   


 


Ur Random Sodium   


 


Urine HCG, Qual  Negative  


 


Salicylates   


 


Urine Opiates Screen   


 


Urine Methadone Screen   


 


Ur Barbiturates Screen   


 


Ur Phencyclidine Scrn   


 


Ur Amphetamines Screen   


 


U Benzodiazepines Scrn   


 


U Oth Cocaine Metabols   


 


U Cannabinoids Screen   


 


Alcohol, Quantitative    < 10














  03/21/18 03/21/18 03/21/18





  03:17 04:40 04:40


 


WBC   


 


RBC   


 


Hgb   


 


Hct   


 


MCV   


 


MCH   


 


MCHC   


 


RDW   


 


Plt Count   


 


MPV   


 


Neut % (Auto)   


 


Lymph % (Auto)   


 


Mono % (Auto)   


 


Eos % (Auto)   


 


Baso % (Auto)   


 


Neut # (Auto)   


 


Lymph # (Auto)   


 


Mono # (Auto)   


 


Eos # (Auto)   


 


Baso # (Auto)   


 


Puncture Site   


 


pCO2   


 


pO2   


 


HCO3   


 


ABG pH   


 


ABG Total CO2   


 


ABG O2 Saturation   


 


ABG Base Excess   


 


ABG Hemoglobin   


 


ABG Carboxyhemoglobin   


 


POC ABG HHb (Measured)   


 


ABG Methemoglobin   


 


Yaakov Test   


 


A-a O2 Difference   


 


Respiratory Index   


 


Hgb O2 Saturation   


 


Liter Flow   


 


FiO2   


 


Sodium   


 


Potassium   3.5 L 


 


Chloride   


 


Carbon Dioxide   


 


Anion Gap   


 


BUN   


 


Creatinine   


 


Est GFR ( Amer)   


 


Est GFR (Non-Af Amer)   


 


Random Glucose   


 


Serum Osmolality   


 


Lactic Acid    4.1 H*


 


Calcium   


 


Magnesium   2.3 


 


Total Bilirubin   


 


AST   


 


ALT   


 


Alkaline Phosphatase   


 


Total Protein   


 


Albumin   


 


Globulin   


 


Albumin/Globulin Ratio   


 


Urine Color   


 


Urine Clarity   


 


Urine pH   


 


Ur Specific Gravity   


 


Urine Protein   


 


Urine Glucose (UA)   


 


Urine Ketones   


 


Urine Blood   


 


Urine Nitrate   


 


Urine Bilirubin   


 


Urine Urobilinogen   


 


Ur Leukocyte Esterase   


 


Urine WBC (Auto)   


 


Urine RBC (Auto)   


 


Urine Bacteria   


 


Granular Casts (Auto)   


 


Urine Osmolality   


 


Ur Random Creatinine   


 


Ur Random Sodium   


 


Urine HCG, Qual   


 


Salicylates   


 


Urine Opiates Screen  Negative  


 


Urine Methadone Screen  No result  


 


Ur Barbiturates Screen  Negative  


 


Ur Phencyclidine Scrn  Negative  


 


Ur Amphetamines Screen  Negative  


 


U Benzodiazepines Scrn  Negative  


 


U Oth Cocaine Metabols  Negative  


 


U Cannabinoids Screen  Negative  


 


Alcohol, Quantitative   














  03/21/18 03/21/18 03/21/18





  04:50 07:57 08:09


 


WBC   


 


RBC   


 


Hgb   


 


Hct   


 


MCV   


 


MCH   


 


MCHC   


 


RDW   


 


Plt Count   


 


MPV   


 


Neut % (Auto)   


 


Lymph % (Auto)   


 


Mono % (Auto)   


 


Eos % (Auto)   


 


Baso % (Auto)   


 


Neut # (Auto)   


 


Lymph # (Auto)   


 


Mono # (Auto)   


 


Eos # (Auto)   


 


Baso # (Auto)   


 


Puncture Site  Lr  


 


pCO2  33 L  


 


pO2  277 H  


 


HCO3  14.3 L  


 


ABG pH  7.21 L  


 


ABG Total CO2  14.2 L  


 


ABG O2 Saturation  99.5 H  


 


ABG Base Excess  -13.6 L  


 


ABG Hemoglobin  10.0 L  


 


ABG Carboxyhemoglobin  1.1  


 


POC ABG HHb (Measured)  0.5  


 


ABG Methemoglobin  0.8  


 


Yaakov Test  Pos  


 


A-a O2 Difference  395.0  


 


Respiratory Index  1.4  


 


Hgb O2 Saturation  97.6  


 


Liter Flow  15.0  


 


FiO2  100.0  


 


Sodium   


 


Potassium   


 


Chloride   


 


Carbon Dioxide   


 


Anion Gap   


 


BUN   


 


Creatinine   


 


Est GFR ( Amer)   


 


Est GFR (Non-Af Amer)   


 


Random Glucose   


 


Serum Osmolality    292


 


Lactic Acid   


 


Calcium   


 


Magnesium   


 


Total Bilirubin   


 


AST   


 


ALT   


 


Alkaline Phosphatase   


 


Total Protein   


 


Albumin   


 


Globulin   


 


Albumin/Globulin Ratio   


 


Urine Color   


 


Urine Clarity   


 


Urine pH   


 


Ur Specific Gravity   


 


Urine Protein   


 


Urine Glucose (UA)   


 


Urine Ketones   


 


Urine Blood   


 


Urine Nitrate   


 


Urine Bilirubin   


 


Urine Urobilinogen   


 


Ur Leukocyte Esterase   


 


Urine WBC (Auto)   


 


Urine RBC (Auto)   


 


Urine Bacteria   


 


Granular Casts (Auto)   


 


Urine Osmolality   403 


 


Ur Random Creatinine   16.8 


 


Ur Random Sodium   164 


 


Urine HCG, Qual   


 


Salicylates   


 


Urine Opiates Screen   


 


Urine Methadone Screen   


 


Ur Barbiturates Screen   


 


Ur Phencyclidine Scrn   


 


Ur Amphetamines Screen   


 


U Benzodiazepines Scrn   


 


U Oth Cocaine Metabols   


 


U Cannabinoids Screen   


 


Alcohol, Quantitative   














  03/21/18 03/21/18 03/21/18





  08:09 08:09 08:11


 


WBC   


 


RBC   


 


Hgb   


 


Hct   


 


MCV   


 


MCH   


 


MCHC   


 


RDW   


 


Plt Count   


 


MPV   


 


Neut % (Auto)   


 


Lymph % (Auto)   


 


Mono % (Auto)   


 


Eos % (Auto)   


 


Baso % (Auto)   


 


Neut # (Auto)   


 


Lymph # (Auto)   


 


Mono # (Auto)   


 


Eos # (Auto)   


 


Baso # (Auto)   


 


Puncture Site   


 


pCO2   


 


pO2   


 


HCO3   


 


ABG pH   


 


ABG Total CO2   


 


ABG O2 Saturation   


 


ABG Base Excess   


 


ABG Hemoglobin   


 


ABG Carboxyhemoglobin   


 


POC ABG HHb (Measured)   


 


ABG Methemoglobin   


 


Yaakov Test   


 


A-a O2 Difference   


 


Respiratory Index   


 


Hgb O2 Saturation   


 


Liter Flow   


 


FiO2   


 


Sodium  141  


 


Potassium  4.3  


 


Chloride  110 H  


 


Carbon Dioxide  18 L  


 


Anion Gap  17  


 


BUN  6 L  


 


Creatinine  0.7  


 


Est GFR ( Amer)  > 60  


 


Est GFR (Non-Af Amer)  > 60  


 


Random Glucose  73  


 


Serum Osmolality   


 


Lactic Acid   2.6 H 


 


Calcium  8.4 L  


 


Magnesium   


 


Total Bilirubin   


 


AST   


 


ALT   


 


Alkaline Phosphatase   


 


Total Protein   


 


Albumin   


 


Globulin   


 


Albumin/Globulin Ratio   


 


Urine Color   


 


Urine Clarity   


 


Urine pH   


 


Ur Specific Gravity   


 


Urine Protein   


 


Urine Glucose (UA)   


 


Urine Ketones   


 


Urine Blood   


 


Urine Nitrate   


 


Urine Bilirubin   


 


Urine Urobilinogen   


 


Ur Leukocyte Esterase   


 


Urine WBC (Auto)   


 


Urine RBC (Auto)   


 


Urine Bacteria   


 


Granular Casts (Auto)   


 


Urine Osmolality   


 


Ur Random Creatinine   


 


Ur Random Sodium   


 


Urine HCG, Qual   


 


Salicylates    < 1.0


 


Urine Opiates Screen   


 


Urine Methadone Screen   


 


Ur Barbiturates Screen   


 


Ur Phencyclidine Scrn   


 


Ur Amphetamines Screen   


 


U Benzodiazepines Scrn   


 


U Oth Cocaine Metabols   


 


U Cannabinoids Screen   


 


Alcohol, Quantitative   














Attending/Attestation





- Attestation


I have personally seen and examined this patient.: Yes


I have fully participated in the care of the patient.: Yes


I have reviewed all pertinent clinical information: Yes


Notes (Text): 





New onset seizure appears at home, 1st episode prior to fall leading to right 

shoulder dislocation, confirmed proximal fracture, s/p reduction in ER, now on 

sling, second witnessed seizure in ER. Metabolic acidosis and hemoconcentration 

likely form  seizure, clinically not dehydrated, infiltrate right > left likely 

from aspiration. Initial 3 episodes of vomiting, unclear etio, currently not 

vomiting, unremarkeble abd exam and CT. 





Plan


Abx for pna, aspiration


Seizure precaution, MRI of brain, neurology consult


IVF, repeat labs, expect improvement as suspected from seizure episode


GI/DVT prophylaxis


See orders for detail.

## 2018-03-21 NOTE — RAD
HISTORY:

leukocytosis  



COMPARISON:

No prior. 



FINDINGS:



LUNGS:

There is confluent airspace disease in the right lower lobe.  The 

left lung is clear.



PLEURA:

No significant pleural effusion identified, no pneumothorax apparent.



CARDIOVASCULAR:

Normal.



OSSEOUS STRUCTURES:

No significant abnormalities.



VISUALIZED UPPER ABDOMEN:

Normal.



OTHER FINDINGS:

None.



IMPRESSION:

Suspect right lower lobe pneumonia. Follow-up is advised.

## 2018-03-21 NOTE — CARD
--------------- APPROVED REPORT --------------





EXAM: Two-dimensional and M-mode echocardiogram with Doppler and 

color Doppler.



Other Information 

Quality : GoodRhythm : 



INDICATION

Syncope 



2D DIMENSIONS 

IVSd0.8   (0.7-1.1cm)LVDd4.5   (3.9-5.9cm)

PWd0.8   (0.7-1.1cm)LVDs3.2   (2.5-4.0cm)

FS (%) 28.7   %LVEF (%)55.4   (>50%)



M-Mode DIMENSIONS 

Left Atrium (MM)3.33   (2.5-4.0cm)Aortic Root2.86   (2.2-3.7cm)

Aortic Cusp Exc.2.14   (1.5-2.0cm)



Mitral Valve

MV E [id number]/sMV A Cvxirygp65.3cm/sE/A ratio1.6



TDI

E/Lateral E'0.0E/Medial E'0.0



Tricuspid Valve

TR Peak [id number] Peak [id number]



 LEFT VENTRICLE 

The left ventricle is normal size.

There is normal left ventricular wall thickness.

Left ventricle systolic function is normal. The Ejection Fraction is 

50-55%.

There is normal LV segmental wall motion.

The left ventricular diastolic function is normal.

No left ventricle thrombus noted on this study.



 RIGHT VENTRICLE 

The right ventricle is normal size.

The right ventricular systolic function is normal.



 ATRIA 

The left atrium size is normal.

The right atrium size is normal.



 AORTIC VALVE 

The aortic valve is mildly thickened.

The aortic valve is trileaflet.

No aortic regurgitation is present.

There is no aortic valvular stenosis. 

There is no aortic valvular vegetation.



 MITRAL VALVE 

Mitral annular calcification is mild.

There is no evidence of mitral valve prolapse.

There is no mitral valve stenosis.

There is no mitral valve regurgitation noted.



 TRICUSPID VALVE 

The tricuspid valve is normal in structure.

There is trace tricuspid regurgitation.

Right ventricular systolic pressure is estimated at less than 30 

mmHg. 

There is no pulmonary hypertension.

There is no tricuspid valve prolapse or vegetation.

There is no tricuspid valve stenosis. 



 PULMONIC VALVE 

The pulmonic valve is not well visualized.

There is no pulmonic valvular regurgitation. 



 GREAT VESSELS 

The aortic root is normal in size.

The IVC is normal in size and collapses >50% with inspiration.



 PERICARDIAL EFFUSION 

There is no pericardial effusion.

There is no pleural effusion.



<Conclusion>

The left ventricle is normal size.

Left ventricle systolic function is normal. The Ejection Fraction is 

50-55%.

The left ventricular diastolic function is normal.

The right ventricle is normal size.

The right ventricular systolic function is normal.

The left atrium size is normal.

The right atrium size is normal.

There is trace tricuspid regurgitation.

There is no pulmonary hypertension.

Essentially normal M-mode, 2D and doppler echocardiogram.

## 2018-03-21 NOTE — CT
EXAM:

  CT Chest With Intravenous Contrast



EXAM DATE/TIME:

  3/21/2018 6:31 AM



CLINICAL HISTORY:

  37 years old, female; Pain; Chest pain; Additional medially: Infiltrate, ? 

effusion on r lung. Chest w/contrast was add after i done abd &pelv w/iv .



TECHNIQUE:

  Axial computed tomography images of the chest with intravenous contrast.  All 

CT scans at this facility use one or more dose reduction techniques, viz.: 

automated exposure control; ma/kV adjustment per patient size (including 

targeted exams where dose is matched to indication; i.e. head); or iterative 

reconstruction technique.

  Coronal and sagittal reformatted images were created and reviewed.



CONTRAST:

  100 mL of [id number] administered intravenously.



COMPARISON:

  No relevant prior studies available.



FINDINGS:

  LUNGS:  Findings suspicious for pneumonia versus atelectasis in the right 

lower lobe medially. Areas of dense consolidation are seen, associated with air 

bronchograms. Additional areas of dense consolidation in the left lower lobe 

posteriorly, most likely representing dependent atelectasis.  No evidence of 

diffuse pulmonary vascular congestion.

  PLEURAL SPACE:  No pneumothorax or significant pleural effusions seen.

  HEART:  No evidence of significant pericardial effusion.

  BONES/JOINTS:  Fracture of the right proximal humerus, involving the humeral 

head laterally, incompletely seen on this exam, but appearing recent (acute or 

subacute) in nature.  No additional acute fractures seen.

  SOFT TISSUES:  Moderate abnormal fluid/edema in the deep right 

supraclavicular and axillary fat.  Abnormal swelling of the right shoulder soft 

tissues, especially involving the right subscapularis muscle, which appears 

enlarged and heterogeneous.  No evidence of soft tissue gas, focal abscess 

formation, or large soft tissue hematoma.

  VASCULATURE:  Exam is nondiagnostic for aortic dissection and pulmonary 

emboli, secondary to suboptimal enhancement.

  LYMPH NODES:  No evidence of diffuse lymphadenopathy.



IMPRESSION:     

- Right proximal humerus fracture, suspected to be recent (acute or subacute), 

incompletely seen on this exam.

- Moderate fluid/edema in the deep right supraclavicular and axillary fat. 

Swelling of the right shoulder soft tissues, especially the right subscapularis 

muscle, which appears enlarged and heterogeneous. Findings are of uncertain 

etiology, but could represent soft tissue contusion/edema from recent trauma. 

No soft tissue gas or large acute hematoma is seen.

- Findings suspicious for pneumonia in the right lower lobe medially.

- See above for remaining findings.

## 2018-03-21 NOTE — CP.PCM.CON
History of Present Illness





- History of Present Illness


History of Present Illness: 





38yo F. No significant PMHx, p/w 2 days of n/v.  While at home became weak and 

fell, hit right shoulder and dislocated.  In ED had a generalized seizure, 

given Ativan x 1.  Shoulder was reduced in ED.  Patient conscious and admitted 

to ICU for further management.





Review of Systems





- Review of Systems


All systems: reviewed and no additional remarkable complaints except





- Gastrointestinal


Gastrointestinal: Nausea





- Musculoskeletal


Musculoskeletal: Other (right shoulder pain)





Past Patient History





- Past Social History


Smoking Status: Never Smoked





- CARDIAC


Hx Hypertension: No





- GASTROINTESTINAL


Hx Gastritis: Yes





- PSYCHIATRIC


Hx Substance Use: No





Meds


Allergies/Adverse Reactions: 


 Allergies











Allergy/AdvReac Type Severity Reaction Status Date / Time


 


No Known Allergies Allergy   Verified 03/21/18 02:59














- Medications


Medications: 


 Current Medications





Azithromycin 500 mg/ Sodium (Chloride)  250 mls @ 250 mls/hr IVPB DAILY [facility]


   PRN Reason: Protocol


Ceftriaxone Sodium 2 gm/ (Sodium Chloride)  100 mls @ 100 mls/hr IVPB DAILY [facility]


   PRN Reason: Protocol











Physical Exam





- Head Exam


Head Exam: ATRAUMATIC (right shoulder pain), NORMAL INSPECTION, NORMOCEPHALIC





- Eye Exam


Eye Exam: EOMI, Normal appearance, PERRL





- ENT Exam


ENT Exam: Mucous Membranes Moist, Normal Exam





- Respiratory Exam


Respiratory Exam: Clear to Auscultation Bilateral, NORMAL BREATHING PATTERN





- Cardiovascular Exam


Cardiovascular Exam: REGULAR RHYTHM





- GI/Abdominal Exam


GI & Abdominal Exam: Normal Bowel Sounds





Results





- Vital Signs


Recent Vital Signs: 


 Last Vital Signs











Temp  97.6 F   03/21/18 03:45


 


Pulse  106 H  03/21/18 05:40


 


Resp  20   03/21/18 05:40


 


BP  112/72   03/21/18 05:40


 


Pulse Ox  98   03/21/18 06:39














- Labs


Result Diagrams: 


 03/21/18 03:16





 03/21/18 04:40


Labs: 


 Laboratory Results - last 24 hr











  03/21/18 03/21/18 03/21/18





  02:59 03:16 03:16


 


WBC   22.1 H 


 


RBC   4.74 


 


Hgb   13.7 


 


Hct   42.9 


 


MCV   90.5 


 


MCH   28.9 


 


MCHC   32.0 L 


 


RDW   15.0 H 


 


Plt Count   326 


 


MPV   8.9 


 


Neut % (Auto)   72.4 


 


Lymph % (Auto)   22.5 


 


Mono % (Auto)   4.5 


 


Eos % (Auto)   0.3 


 


Baso % (Auto)   0.3 


 


Neut # (Auto)   16.0 H 


 


Lymph # (Auto)   5.0 H 


 


Mono # (Auto)   1.0 H 


 


Eos # (Auto)   0.1 


 


Baso # (Auto)   0.1 


 


Puncture Site   


 


pCO2   


 


pO2   


 


HCO3   


 


ABG pH   


 


ABG Total CO2   


 


ABG O2 Saturation   


 


ABG Base Excess   


 


ABG Hemoglobin   


 


ABG Carboxyhemoglobin   


 


POC ABG HHb (Measured)   


 


ABG Methemoglobin   


 


Yaakov Test   


 


A-a O2 Difference   


 


Respiratory Index   


 


Hgb O2 Saturation   


 


Liter Flow   


 


FiO2   


 


Sodium    153 H


 


Potassium    7.1 H*


 


Chloride    113 H


 


Carbon Dioxide    17 L


 


Anion Gap    29 H


 


BUN    8


 


Creatinine    0.9


 


Est GFR ( Amer)    > 60


 


Est GFR (Non-Af Amer)    > 60


 


Random Glucose    260 H


 


Lactic Acid   


 


Calcium    10.2


 


Magnesium   


 


Total Bilirubin    0.7


 


AST    47 H


 


ALT    43


 


Alkaline Phosphatase    125


 


Total Protein    9.2 H


 


Albumin    4.9


 


Globulin    4.3 H


 


Albumin/Globulin Ratio    1.1


 


Urine Color  Straw  


 


Urine Clarity  Clear  


 


Urine pH  5.0  


 


Ur Specific Gravity  1.015  


 


Urine Protein  2+ H  


 


Urine Glucose (UA)  1+  


 


Urine Ketones  Negative  


 


Urine Blood  2+ H  


 


Urine Nitrate  Negative  


 


Urine Bilirubin  Negative  


 


Urine Urobilinogen  Normal  


 


Ur Leukocyte Esterase  Neg  


 


Urine WBC (Auto)  1  


 


Urine RBC (Auto)  15 H  


 


Urine Bacteria  Rare  


 


Granular Casts (Auto)  7  


 


Urine HCG, Qual  Negative  


 


Urine Opiates Screen   


 


Urine Methadone Screen   


 


Ur Barbiturates Screen   


 


Ur Phencyclidine Scrn   


 


Ur Amphetamines Screen   


 


U Benzodiazepines Scrn   


 


U Oth Cocaine Metabols   


 


U Cannabinoids Screen   


 


Alcohol, Quantitative    < 10














  03/21/18 03/21/18 03/21/18





  03:17 04:40 04:40


 


WBC   


 


RBC   


 


Hgb   


 


Hct   


 


MCV   


 


MCH   


 


MCHC   


 


RDW   


 


Plt Count   


 


MPV   


 


Neut % (Auto)   


 


Lymph % (Auto)   


 


Mono % (Auto)   


 


Eos % (Auto)   


 


Baso % (Auto)   


 


Neut # (Auto)   


 


Lymph # (Auto)   


 


Mono # (Auto)   


 


Eos # (Auto)   


 


Baso # (Auto)   


 


Puncture Site   


 


pCO2   


 


pO2   


 


HCO3   


 


ABG pH   


 


ABG Total CO2   


 


ABG O2 Saturation   


 


ABG Base Excess   


 


ABG Hemoglobin   


 


ABG Carboxyhemoglobin   


 


POC ABG HHb (Measured)   


 


ABG Methemoglobin   


 


Yaakov Test   


 


A-a O2 Difference   


 


Respiratory Index   


 


Hgb O2 Saturation   


 


Liter Flow   


 


FiO2   


 


Sodium   


 


Potassium   3.5 L 


 


Chloride   


 


Carbon Dioxide   


 


Anion Gap   


 


BUN   


 


Creatinine   


 


Est GFR ( Amer)   


 


Est GFR (Non-Af Amer)   


 


Random Glucose   


 


Lactic Acid    4.1 H*


 


Calcium   


 


Magnesium   2.3 


 


Total Bilirubin   


 


AST   


 


ALT   


 


Alkaline Phosphatase   


 


Total Protein   


 


Albumin   


 


Globulin   


 


Albumin/Globulin Ratio   


 


Urine Color   


 


Urine Clarity   


 


Urine pH   


 


Ur Specific Gravity   


 


Urine Protein   


 


Urine Glucose (UA)   


 


Urine Ketones   


 


Urine Blood   


 


Urine Nitrate   


 


Urine Bilirubin   


 


Urine Urobilinogen   


 


Ur Leukocyte Esterase   


 


Urine WBC (Auto)   


 


Urine RBC (Auto)   


 


Urine Bacteria   


 


Granular Casts (Auto)   


 


Urine HCG, Qual   


 


Urine Opiates Screen  Negative  


 


Urine Methadone Screen  No result  


 


Ur Barbiturates Screen  Negative  


 


Ur Phencyclidine Scrn  Negative  


 


Ur Amphetamines Screen  Negative  


 


U Benzodiazepines Scrn  Negative  


 


U Oth Cocaine Metabols  Negative  


 


U Cannabinoids Screen  Negative  


 


Alcohol, Quantitative   














  03/21/18





  04:50


 


WBC 


 


RBC 


 


Hgb 


 


Hct 


 


MCV 


 


MCH 


 


MCHC 


 


RDW 


 


Plt Count 


 


MPV 


 


Neut % (Auto) 


 


Lymph % (Auto) 


 


Mono % (Auto) 


 


Eos % (Auto) 


 


Baso % (Auto) 


 


Neut # (Auto) 


 


Lymph # (Auto) 


 


Mono # (Auto) 


 


Eos # (Auto) 


 


Baso # (Auto) 


 


Puncture Site  Lr


 


pCO2  33 L


 


pO2  277 H


 


HCO3  14.3 L


 


ABG pH  7.21 L


 


ABG Total CO2  14.2 L


 


ABG O2 Saturation  99.5 H


 


ABG Base Excess  -13.6 L


 


ABG Hemoglobin  10.0 L


 


ABG Carboxyhemoglobin  1.1


 


POC ABG HHb (Measured)  0.5


 


ABG Methemoglobin  0.8


 


Yaakov Test  Pos


 


A-a O2 Difference  395.0


 


Respiratory Index  1.4


 


Hgb O2 Saturation  97.6


 


Liter Flow  15.0


 


FiO2  100.0


 


Sodium 


 


Potassium 


 


Chloride 


 


Carbon Dioxide 


 


Anion Gap 


 


BUN 


 


Creatinine 


 


Est GFR ( Amer) 


 


Est GFR (Non-Af Amer) 


 


Random Glucose 


 


Lactic Acid 


 


Calcium 


 


Magnesium 


 


Total Bilirubin 


 


AST 


 


ALT 


 


Alkaline Phosphatase 


 


Total Protein 


 


Albumin 


 


Globulin 


 


Albumin/Globulin Ratio 


 


Urine Color 


 


Urine Clarity 


 


Urine pH 


 


Ur Specific Gravity 


 


Urine Protein 


 


Urine Glucose (UA) 


 


Urine Ketones 


 


Urine Blood 


 


Urine Nitrate 


 


Urine Bilirubin 


 


Urine Urobilinogen 


 


Ur Leukocyte Esterase 


 


Urine WBC (Auto) 


 


Urine RBC (Auto) 


 


Urine Bacteria 


 


Granular Casts (Auto) 


 


Urine HCG, Qual 


 


Urine Opiates Screen 


 


Urine Methadone Screen 


 


Ur Barbiturates Screen 


 


Ur Phencyclidine Scrn 


 


Ur Amphetamines Screen 


 


U Benzodiazepines Scrn 


 


U Oth Cocaine Metabols 


 


U Cannabinoids Screen 


 


Alcohol, Quantitative 














Assessment & Plan


(1) Sepsis


Assessment and Plan: 


38yo F. No significant PMHx, p/w 2 days of n/v, sepsis, traumatic shoulder 

injury and subsequent seizure activity.





Neuro: alert and following commands.  Seizure activity secondary to metabolic 

derangements with sepsis, no need for anti seizure meds currently, will monitor 

closely.





Pulm: RLL pneumonia, abx, duonebs, fluids.





CV: hemodynamically stable.





Renal: HAGMA, from lactic acidosis, secondary to sepsis.  Hypovolemic 

hyponatremia, q4h BMP.  3L boluses, maintenance NS@150.





GI: regular diet





ID: severe sepsis, community acquired pneumonia, ceftriaxone and azithromycin.  

Must consider other etiologies with recent history living in [place].





DVT proph - lovenox


GI proph - not currently indicated


Full code





Critical Care time 35 minutes


Status: Acute

## 2018-03-22 LAB
ALBUMIN SERPL-MCNC: 3.2 G/DL (ref 3.5–5)
ALBUMIN/GLOB SERPL: 1 {RATIO} (ref 1–2.1)
ALT SERPL-CCNC: 41 U/L (ref 9–52)
AST SERPL-CCNC: 47 U/L (ref 14–36)
BASOPHILS # BLD AUTO: 0 K/UL (ref 0–0.2)
BASOPHILS NFR BLD: 0.3 % (ref 0–2)
BUN SERPL-MCNC: 5 MG/DL (ref 7–17)
CALCIUM SERPL-MCNC: 7.4 MG/DL (ref 8.6–10.4)
EOSINOPHIL # BLD AUTO: 0 K/UL (ref 0–0.7)
EOSINOPHIL NFR BLD: 0.2 % (ref 0–4)
ERYTHROCYTE [DISTWIDTH] IN BLOOD BY AUTOMATED COUNT: 14.3 % (ref 11.5–14.5)
GFR NON-AFRICAN AMERICAN: > 60
HGB BLD-MCNC: 10.3 G/DL (ref 11–16)
LYMPHOCYTES # BLD AUTO: 1.9 K/UL (ref 1–4.3)
LYMPHOCYTES NFR BLD AUTO: 18.8 % (ref 20–40)
MCH RBC QN AUTO: 29.4 PG (ref 27–31)
MCHC RBC AUTO-ENTMCNC: 33.9 G/DL (ref 33–37)
MCV RBC AUTO: 86.7 FL (ref 81–99)
MONOCYTES # BLD: 0.7 K/UL (ref 0–0.8)
MONOCYTES NFR BLD: 7.2 % (ref 0–10)
NEUTROPHILS # BLD: 7.5 K/UL (ref 1.8–7)
NEUTROPHILS NFR BLD AUTO: 73.5 % (ref 50–75)
NRBC BLD AUTO-RTO: 0 % (ref 0–2)
PLATELET # BLD: 214 K/UL (ref 130–400)
PMV BLD AUTO: 8.7 FL (ref 7.2–11.7)
RBC # BLD AUTO: 3.51 MIL/UL (ref 3.8–5.2)
WBC # BLD AUTO: 10.1 K/UL (ref 4.8–10.8)

## 2018-03-22 RX ADMIN — Medication SCH MG: at 17:22

## 2018-03-22 RX ADMIN — ENOXAPARIN SODIUM SCH MG: 40 INJECTION SUBCUTANEOUS at 09:44

## 2018-03-22 RX ADMIN — POTASSIUM CHLORIDE SCH MEQ: 20 TABLET, EXTENDED RELEASE ORAL at 23:18

## 2018-03-22 RX ADMIN — TAZOBACTAM SODIUM AND PIPERACILLIN SODIUM SCH MLS/HR: 375; 3 INJECTION, SOLUTION INTRAVENOUS at 17:45

## 2018-03-22 RX ADMIN — POTASSIUM CHLORIDE SCH MEQ: 20 TABLET, EXTENDED RELEASE ORAL at 17:41

## 2018-03-22 NOTE — CP.PCM.PN
Subjective





- Date & Time of Evaluation


Date of Evaluation: 03/22/18


Time of Evaluation: 06:41





- Subjective


Subjective: 





Ms. Adam was seen and examined at the bedside in ICU. She is alert, oriented  

x3. She denies any headache, lightheadedness, blurred vision, diplopia. She 

complains of pain in her right shoulder with movement. She i sable to move 

minimally her fingers in her right hand, but spontaneously move all other 

extremities. MRI of the brain done 3/21/2018 showed no acute intracranial 

findings. There was no untoward events overnight.





Objective





- Vital Signs/Intake and Output


Vital Signs (last 24 hours): 


 











Temp Pulse Resp BP Pulse Ox


 


 98.5 F   88   20   114/78   95 


 


 03/22/18 04:00  03/22/18 06:00  03/22/18 06:00  03/22/18 05:03  03/22/18 06:00








Intake and Output: 


 











 03/21/18 03/22/18





 18:59 06:59


 


Intake Total 3074 2440


 


Output Total 1725 1000


 


Balance 1349 1440














- Medications


Medications: 


 Current Medications





Enoxaparin Sodium (Lovenox)  40 mg SC DAILY [facility]


   Last Admin: 03/21/18 16:18 Dose:  40 mg


Azithromycin 500 mg/ Sodium (Chloride)  250 mls @ 250 mls/hr IVPB DAILY [facility]


   PRN Reason: Protocol


   Last Admin: 03/21/18 10:44 Dose:  250 mls/hr


Ceftriaxone Sodium 2 gm/ (Sodium Chloride)  100 mls @ 100 mls/hr IVPB Q24H [facility]


   PRN Reason: Protocol


Sodium Chloride (Sodium Chloride 0.9%)  1,000 mls @ 150 mls/hr IV .Q6H40M [facility]


   Last Admin: 03/22/18 03:00 Dose:  150 mls/hr


Levetiracetam 500 mg/ Sodium (Chloride)  105 mls @ 420 mls/hr IVPB Q12H [facility]


   Last Admin: 03/22/18 05:17 Dose:  420 mls/hr


Pantoprazole Sodium (Protonix Inj)  40 mg IVP DAILY [facility]


   Last Admin: 03/21/18 10:07 Dose:  40 mg











- Labs


Labs: 


 





 03/22/18 05:50 





 03/22/18 05:57 











- Constitutional


Appears: No Acute Distress





- Head Exam


Head Exam: NORMAL INSPECTION





- Neurological Exam


Neurological Exam: Alert, Awake, Oriented x3


Neuro motor strength exam: Left Upper Extremity: 5, Left Lower Extremity: 5


Additional comments: 





She is alert, oriented, follows simple commands. Sensation is intact.





Assessment and Plan


(1) Seizures


Assessment & Plan: 


Case discussed with Dr. Zhang, continue all current medical, physical 

therapies. Pending EEG.


Status: Acute

## 2018-03-22 NOTE — CP.PCM.PN
Subjective





- Date & Time of Evaluation


Date of Evaluation: 03/22/18


Time of Evaluation: 10:00





- Subjective


Subjective: 





Hospitalist Progress Note





Patient was seen and examined at 10:00 AM 3/22/18 ICU Bed 18





37 year old female who was admitted morning of 3/21/18 s/p fall and was found 

to have Seizure, Right Shoulder Dislocation, and RLL Pneumonia. Her Right 

Shoulder was reduced in the emergency room and she was then admitted to the ICU 

for further treatment and evaluation.





Upon ROS:


Right Shoulder pain


Right lateral tongue pain


Has not moved bowels today


NO other complaints





Exam:


General: AAOX3, NAD


HEENT: NCA, EOMI, PERRLA, NO cervical/supraclavicular/submandibular 

lymphadenopathy, NO pharyngeal erythema/exudate, Nasal Turbinates are 

nonerythematous/nonedematous, Oral Mucosa is moist, Right Lateral Tongue 

Laceration and Left Lateral Tongue Laceration starting to scab over


Cardio: NS1 and NS2, NO M/R/G


Resp: CTA B/L, NO R/R/W (DID NOT APPRECIATE ANY RALES)


GI: BSx4, Soft, NT, NO HSM, NO guarding/rebound tenderness


Ext: Pulses are strong and equal, Capillary Refill is 2 seconds, NO edema, 

Right Arm in Flexion in Sling


Neuro: CN II through XII are grossly intact





Assessment and Plan:





1). Sepsis Secondary to RLL Pneumonia


CT Chest 3/21/18 showed evidence of RLL


Azithromycin 500 mg IV 1x/day (3/21/18)


Ceftriaxone 2 gm IV Q24H (3/21/18)


F/U Blood Culture


F/U Urine Culture


F/U Mycoplasma IgG, IgM


F/U Urine Strep Ag


F/U Urine Legionella


Rapid Influenza is negative





Status: Acute





2). Seizure


Keppra 500 mg PO 2x/day


CT Head 3/21/18: NO acute findings


Brain MRI 3/21/18: normal pre and post contrast enhanced brain


F/U EEG


Neurology Dr. Marcano





Status: Acute





3). Right Shoulder Dislocation/Right Humerus Fracture


Reduced in ER at the time of admission


F/U Right Shoulder CT 


Orthopedics Dr. Alvarado





Status: Acute





4). Tongue Laceration


Secondary to the seizure


Lidocaine 2% Viscous 15 ml PO Swish and Spit ACHS


 


Status: Acute





5). Prohylaxis


Lovenox 40 mg SC 1x/day 


Protonix 40 mg IV 1x/day


Florastor 250 mg PO 2x/day


Regular Diet





Status: Acute





Benton Worrell D.O.








Objective





- Vital Signs/Intake and Output


Vital Signs (last 24 hours): 


 











Temp Pulse Resp BP Pulse Ox


 


 99.3 F   94 H  17   111/71   98 


 


 03/22/18 08:00  03/22/18 09:03  03/22/18 09:03  03/22/18 09:03  03/22/18 09:03








Intake and Output: 


 











 03/22/18 03/22/18





 06:59 18:59


 


Intake Total 2440 150


 


Output Total 1000 


 


Balance 1440 150














- Medications


Medications: 


 Current Medications





Enoxaparin Sodium (Lovenox)  40 mg SC DAILY [facility]


   Last Admin: 03/22/18 09:44 Dose:  40 mg


Azithromycin 500 mg/ Sodium (Chloride)  250 mls @ 250 mls/hr IVPB DAILY [facility]


   PRN Reason: Protocol


   Last Admin: 03/22/18 09:45 Dose:  250 mls/hr


Ceftriaxone Sodium 2 gm/ (Sodium Chloride)  100 mls @ 100 mls/hr IVPB Q24H [facility]


   PRN Reason: Protocol


   Last Admin: 03/22/18 08:14 Dose:  100 mls/hr


Levetiracetam 500 mg/ Sodium (Chloride)  105 mls @ 420 mls/hr IVPB Q12H [facility]


   Last Admin: 03/22/18 05:17 Dose:  420 mls/hr


Lactated Ringer's (Lactated Ringer's)  1,000 mls @ 100 mls/hr IV .Q10H [facility]


   Last Admin: 03/22/18 09:44 Dose:  100 mls/hr


Pantoprazole Sodium (Protonix Inj)  40 mg IVP DAILY [facility]


   Last Admin: 03/22/18 09:44 Dose:  40 mg











- Labs


Labs: 


 





 03/22/18 05:50 





 03/22/18 05:57

## 2018-03-22 NOTE — CT
PROCEDURE:  CT right shoulder



HISTORY:

right shoulder fracture s/p closed reduction



COMPARISON:

Not available



TECHNIQUE:

2.5 mm contiguous axial sections were acquired through the right 

shoulder.  Sagittal and coronal images were reformatted from the 

axial data.



FINDINGS:

The patient is status post close reduction of anterior dislocation 

seen on 3/21/2018. There is extensive comminuted fracture of the 

greater tuberosity.  There is no humeral neck fracture. There is 

displacement of some greater tuberosity fracture fragments. The 

humeral head is normally situated in relation to the glenoid. 



There is a very small nondisplaced fracture noted involving the 

anterior inferior glenoid, consistent with bony Bankart deformity. 

There is additional a amorphous focal dens adjacent to the anterior 

glenoid which may have resulted from an anterior glenoid fracture or 

may represent chronic soft tissue calcification. 



There is no other fracture identified. 



There is no significant soft tissue abnormality seen. 



IMPRESSION:

Comminuted displaced greater tuberosity fracture.  Successful closed 

reduction of anterior glenohumeral dislocation.  Probable tiny bony 

Bankart lesion.

## 2018-03-22 NOTE — MRI
PROCEDURE:  Magnetic Resonance Angiography Brain



HISTORY:

s/p seizure







COMPARISON:

None available. 



TECHNIQUE:

3D time of flight MR angiography of the intracranial arteries was 

performed. Rotating maximum intensity projection images were 

generated.



FINDINGS:



INTERNAL CAROTID ARTERIES:

The distal skullbase bilateral ICA segments are obscured by our bony 

artifact but appear patent nevertheless. The petrous, and 

supraclinoid segments are bilaterally widely patient. The bilateral 

cavernous segments appear widely patent as well, however, bilateral 

infundibuli related to the origins of the bilateral ophthalmic 

arteries are favored over small aneurysms. Correlation with CT 

angiogram of the brain is recommended.



ANTERIOR CEREBRAL ARTERIES:

Unremarkable. A1 and A2 segments are widely patent. Smaller distal 

branches unremarkable, as visualized.



MIDDLE CEREBRAL ARTERIES:

Unremarkable. M1 and M2 segments are widely patent. Perisylvian 

branches grossly symmetric.



POSTERIOR CIRCULATION:

Basilar Artery: Unremarkable.



Distal Vertebral Arteries: Unremarkable.



Posterior Cerebral Arteries: There is a slight beaded pattern 

appreciated related to the right greater than left posterior cerebral 

arteries this may reflect a limited fibromuscular dysplasia type 

pattern.



Posterior Inferior Cerebellar Arteries: Unremarkable.



ANEURYSM/ VASCULAR MALFORMATIONS:

None.



OTHER FINDINGS:

None. 



IMPRESSION:

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 

greater than left posterior arteries.  Further, bilateral infundibuli 

are favored over small, bilateral ophthalmic artery aneurysms. 

Follow-up contrast CTA of the brain may be useful for additional 

characterization.

## 2018-03-22 NOTE — CP.PCM.CON
History of Present Illness





- History of Present Illness


History of Present Illness: 





Orthopedic consultation Dr. Alvarado





37F RHD complains of right shoulder pain after fall during seizure yesterday. 

Shoulder dislocation was found on ER in ED and closed reduction was performed 

in ED. Patient states pain is better now. Currently denies numbness/tingling in 

right arm/hand. She denies pain in other extremities. Denies headache/neck pain/

back pain. Denies CP//dizziness. No prior shoulder dislocation or injury. 





Review of Systems





- Review of Systems


All systems: reviewed and no additional remarkable complaints except





- Constitutional


Constitutional: Weakness





- Cardiovascular


Cardiovascular: As Per HPI





- Respiratory


Respiratory: As Per HPI





- Gastrointestinal


Gastrointestinal: As Per HPI





- Musculoskeletal


Musculoskeletal: As Per HPI





- Integumentary


Additional comments: 





swelling





- Hematologic/Lymphatic


Hematologic: absent: As Per HPI, Easy Bleeding, Easy Bruising, Lymphadenopathy, 

Other





Past Patient History





- Past Medical History & Family History


Past Medical History?: No


Past Family History: Reviewed and not pertinent





- Past Social History


Smoking Status: Never Smoked


Alcohol: None


Drugs: Denies


Home Situation {Lives}: With Family





- CARDIAC


Hx Hypertension: No





- MUSCULOSKELETAL/RHEUMATOLOGICAL


Hx Falls: Yes





- GASTROINTESTINAL


Hx Gastritis: Yes





- PSYCHIATRIC


Hx Substance Use: No





- ANESTHESIA


Hx Anesthesia: Yes


Hx Anesthesia Reactions: No


Hx Malignant Hyperthermia: No





Meds


Allergies/Adverse Reactions: 


 Allergies











Allergy/AdvReac Type Severity Reaction Status Date / Time


 


No Known Allergies Allergy   Verified 18 02:59














- Medications


Medications: 


 Current Medications





Enoxaparin Sodium (Lovenox)  40 mg SC DAILY [facility]


   Last Admin: 18 16:18 Dose:  40 mg


Azithromycin 500 mg/ Sodium (Chloride)  250 mls @ 250 mls/hr IVPB DAILY [facility]


   PRN Reason: Protocol


   Last Admin: 18 10:44 Dose:  250 mls/hr


Ceftriaxone Sodium 2 gm/ (Sodium Chloride)  100 mls @ 100 mls/hr IVPB Q24H [facility]


   PRN Reason: Protocol


   Last Admin: 18 08:14 Dose:  100 mls/hr


Levetiracetam 500 mg/ Sodium (Chloride)  105 mls @ 420 mls/hr IVPB Q12H [facility]


   Last Admin: 18 05:17 Dose:  420 mls/hr


Lactated Ringer's (Lactated Ringer's)  1,000 mls @ 100 mls/hr IV .Q10H [facility]


Pantoprazole Sodium (Protonix Inj)  40 mg IVP DAILY [facility]


   Last Admin: 18 10:07 Dose:  40 mg











Physical Exam





- Constitutional


Appears: Well, No Acute Distress





- Head Exam


Head Exam: ATRAUMATIC





- Respiratory Exam


Respiratory Exam: NORMAL BREATHING PATTERN





- Cardiovascular Exam


Additional comments: 





+radial pulse





- Expanded Upper Extremities Exam


  ** Right


Shoulder exam: swelling, tenderness


Elbow exam: full ROM, normal inspection


Forearm Wrist exam: normal inspection


Neuro motor exam: finger 2-5 abduction intact, thumb opposition intact, wrist 

extension intact


Neurosensory exam: median nerve intact, radial nerve intact, ulnar nerve intact


Vascular exam: radial pulse





- Neurological Exam


Neurological exam: Alert, Oriented x3





- Psychiatric Exam


Psychiatric exam: Normal Affect, Normal Mood





- Skin


Skin Exam: Dry, Intact, Normal Color, Warm





Results





- Vital Signs


Recent Vital Signs: 


 Last Vital Signs











Temp  99.3 F   18 08:00


 


Pulse  94 H  18 09:03


 


Resp  17   18 09:03


 


BP  111/71   18 09:03


 


Pulse Ox  98   18 09:03














- Labs


Result Diagrams: 


 18 05:50





 18 05:57


Labs: 


 Laboratory Results - last 24 hr











  18





  15:36 15:56 05:50


 


WBC    10.1  D


 


RBC    3.51 L


 


Hgb    10.3 L D


 


Hct    30.4 L


 


MCV    86.7  D


 


MCH    29.4


 


MCHC    33.9


 


RDW    14.3


 


Plt Count    214  D


 


MPV    8.7


 


Neut % (Auto)    73.5


 


Lymph % (Auto)    18.8 L


 


Mono % (Auto)    7.2


 


Eos % (Auto)    0.2


 


Baso % (Auto)    0.3


 


Neut # (Auto)    7.5 H


 


Lymph # (Auto)    1.9


 


Mono # (Auto)    0.7


 


Eos # (Auto)    0.0


 


Baso # (Auto)    0.0


 


Sodium   


 


Potassium   


 


Chloride   


 


Carbon Dioxide   


 


Anion Gap   


 


BUN   


 


Creatinine   


 


Est GFR ( Amer)   


 


Est GFR (Non-Af Amer)   


 


Random Glucose   


 


Lactic Acid  1.9  


 


Calcium   


 


Phosphorus   


 


Magnesium   


 


Total Bilirubin   


 


AST   


 


ALT   


 


Alkaline Phosphatase   


 


Total Protein   


 


Albumin   


 


Globulin   


 


Albumin/Globulin Ratio   


 


Influenza Typ A,B (EIA)   Negative for flu a/b 














  18





  05:57


 


WBC 


 


RBC 


 


Hgb 


 


Hct 


 


MCV 


 


MCH 


 


MCHC 


 


RDW 


 


Plt Count 


 


MPV 


 


Neut % (Auto) 


 


Lymph % (Auto) 


 


Mono % (Auto) 


 


Eos % (Auto) 


 


Baso % (Auto) 


 


Neut # (Auto) 


 


Lymph # (Auto) 


 


Mono # (Auto) 


 


Eos # (Auto) 


 


Baso # (Auto) 


 


Sodium  139


 


Potassium  3.4 L


 


Chloride  110 H


 


Carbon Dioxide  19 L


 


Anion Gap  14


 


BUN  5 L


 


Creatinine  0.7


 


Est GFR ( Amer)  > 60


 


Est GFR (Non-Af Amer)  > 60


 


Random Glucose  80


 


Lactic Acid 


 


Calcium  7.4 L


 


Phosphorus  3.2


 


Magnesium  2.0


 


Total Bilirubin  1.3


 


AST  47 H


 


ALT  41


 


Alkaline Phosphatase  70


 


Total Protein  6.2 L


 


Albumin  3.2 L D


 


Globulin  3.1


 


Albumin/Globulin Ratio  1.0


 


Influenza Typ A,B (EIA) 














- Impressions


Impression: 





Patient Name / ID : ATA NORMANU  / [id number]


Exam Date : 2018 03:26:39 ( Approved )


Study Comment : 


Sex / Age : F  / 37Y





Creator : Dung Mane


Dictator : Yari Silvestre MD


 : 


Approver : Yari Silvestre MD


Approver2 : 





Report Date : 2018 08:43:24


My Comment : 


********************************************************************************

***





PROCEDURE:  Radiographs of the Right Shoulder





HISTORY:


Pain, fall, deformity





COMPARISON:


No prior.





FINDINGS:





BONES:


There is an acute displaced comminuted fracture in the greater tuberosity of 

the humerus.





JOINTS:


There is near normal glenohumeral alignment. The acromioclavicular joint is 

normal.





SOFT TISSUES:


Normal.





OTHER FINDINGS:


None.





IMPRESSION:


Status post closed reduction, near normal glenohumeral alignment and acute 

comminuted displaced fracture in the greater tuberosity of the humerus with




















Assessment & Plan


(1) Closed fracture dislocation of right shoulder joint


Assessment and Plan: 


reduced in ER


Still reduced on Chest CT


CT of shoulder ordered


shoulder immobilizer at all times


ice


NWB


d/w Dr. Alvarado, awaiting CT








Addendum: 





Imaging reviewed by Dr. Alvarado


arthroscopy/ORIF indicated


risks/benefits/alternatives of surgery discussed with patient and  at 

length, verbalized understanding and consented to procedure.


NPO p MN for OR 3/23 3pm if medically optimized





Status: Acute

## 2018-03-22 NOTE — CP.CCUPN
<Georgina Rizvi - Last Filed: 03/22/18 12:45>





CCU Subjective





- Physician Review


Subjective (Free Text): 





03/22/18 12:00





Patient seen and examined at bedside. Per nursing no acute events overnight. No 

more seizures. Patient does not recall what happened yesterday. States that she 

had a seizure 4 years ago while pregnant, unsure which anti-seizure medication 

she was taking. Currently she is doing well, offers no complaints. Denies 

headaches, dizziness, cp, palpitations, sob, cough, abdominal pain, urinary 

symptoms. 





CCU Objective





- Vital Signs / Intake & Output


Vital Signs (Last 4 hours): 


Vital Signs











  Pulse Resp BP Pulse Ox


 


 03/22/18 09:03  94 H  17  111/71  98


 


 03/22/18 08:04  84  18  106/72  96











Intake and Output (Last 8hrs): 


 Intake & Output











 03/21/18 03/22/18 03/22/18





 22:59 06:59 14:59


 


Intake Total 1822 1480 150


 


Output Total 500 500 


 


Balance 1322 980 150


 


Weight  83.325 kg 


 


Intake:   


 


  Intake, IV Amount 1262 1300 150


 


    Left Upper arm 1262 1300 150


 


  Oral 560 180 


 


Output:   


 


  Urine 500 500 


 


    Urine, Voided 500 500 


 


Other:   


 


  # Voids   


 


    Urine, Voided 1 1 


 


  # Bowel Movements 0  














- Physical Exam


Head: Positive for: Atraumatic, Normocephalic


Pupils: Positive for: PERRL


Extroacular Muscles: Positive for: EOMI


Conjunctiva: Positive for: Normal


Mouth: Positive for: Moist Mucous Membranes


Neck: Positive for: Normal Range of Motion


Respiratory/Chest: Positive for: Clear to Auscultation, Good Air Exchange.  

Negative for: Respiratory Distress, Accessory Muscle Use


Cardiovascular: Positive for: Regular Rate and Rhythm, Normal S1, S2.  Negative 

for: Murmurs


Abdomen: Positive for: Normal Bowel Sounds.  Negative for: Tenderness, 

Distention, Peritoneal Signs


Upper Extremity: Positive for: NORMAL PULSES, Tenderness (Right shoulder), 

Swelling


Lower Extremity: Positive for: Normal Inspection


Neurological: Positive for: GCS=15, CN II-XII Intact, Speech Normal


Skin: Positive for: Warm, Dry, Normal Color


Psychiatric: Positive for: Alert, Oriented x 3





- Medications


Active Medications: 


Active Medications











Generic Name Dose Route Start Last Admin





  Trade Name Freq  PRN Reason Stop Dose Admin


 


Enoxaparin Sodium  40 mg  03/21/18 10:00  03/22/18 09:44





  Lovenox  SC   40 mg





  DAILY [facility]   Administration


 


Azithromycin 500 mg/ Sodium  250 mls @ 250 mls/hr  03/21/18 10:00  03/22/18 09:

45





  Chloride  IVPB   250 mls/hr





  DAILY [facility]   Administration





  Protocol   


 


Ceftriaxone Sodium 2 gm/  100 mls @ 100 mls/hr  03/22/18 09:00  03/22/18 08:14





  Sodium Chloride  IVPB   100 mls/hr





  Q24H [facility]   Administration





  Protocol   


 


Levetiracetam 500 mg/ Sodium  105 mls @ 420 mls/hr  03/21/18 17:00  03/22/18 05:

17





  Chloride  IVPB   420 mls/hr





  Q12H [facility]   Administration


 


Lactated Ringer's  1,000 mls @ 100 mls/hr  03/22/18 09:15  03/22/18 09:44





  Lactated Ringer's  IV   100 mls/hr





  .Q10H [facility]   Administration


 


Lidocaine HCl  15 ml  03/22/18 16:30  





  Lidocaine 2% Viscous  PO   





  [facility]   


 


Morphine Sulfate  2 mg  03/22/18 11:59  





  Morphine  IVP   





  Q4 PRN   





  Pain, moderate (4-7)   


 


Pantoprazole Sodium  40 mg  03/21/18 10:00  03/22/18 09:44





  Protonix Inj  IVP   40 mg





  DAILY [facility]   Administration


 


Saccharomyces Boulardii  250 mg  03/22/18 18:00  





  Florastor  PO   





  BID [facility]   














- Patient Studies


Lab Studies: 


 Microbiology Studies











 03/21/18 06:49 MRSA Culture (Admit) - Final





 Nose    MRSA NOT DETECTED








 Lab Studies











  03/22/18 03/22/18 03/21/18 Range/Units





  05:57 05:50 15:56 


 


WBC   10.1  D   (4.8-10.8)  K/uL


 


RBC   3.51 L   (3.80-5.20)  Mil/uL


 


Hgb   10.3 L D   (11.0-16.0)  g/dL


 


Hct   30.4 L   (34.0-47.0)  %


 


MCV   86.7  D   (81.0-99.0)  fL


 


MCH   29.4   (27.0-31.0)  pg


 


MCHC   33.9   (33.0-37.0)  g/dL


 


RDW   14.3   (11.5-14.5)  %


 


Plt Count   214  D   (130-400)  K/uL


 


MPV   8.7   (7.2-11.7)  fL


 


Neut % (Auto)   73.5   (50.0-75.0)  %


 


Lymph % (Auto)   18.8 L   (20.0-40.0)  %


 


Mono % (Auto)   7.2   (0.0-10.0)  %


 


Eos % (Auto)   0.2   (0.0-4.0)  %


 


Baso % (Auto)   0.3   (0.0-2.0)  %


 


Neut # (Auto)   7.5 H   (1.8-7.0)  K/uL


 


Lymph # (Auto)   1.9   (1.0-4.3)  K/uL


 


Mono # (Auto)   0.7   (0.0-0.8)  K/uL


 


Eos # (Auto)   0.0   (0.0-0.7)  K/uL


 


Baso # (Auto)   0.0   (0.0-0.2)  K/uL


 


Sodium  139    (132-148)  mmol/L


 


Potassium  3.4 L    (3.6-5.2)  mmol/L


 


Chloride  110 H    ()  mmol/L


 


Carbon Dioxide  19 L    (22-30)  mmol/L


 


Anion Gap  14    (10-20)  


 


BUN  5 L    (7-17)  mg/dL


 


Creatinine  0.7    (0.7-1.2)  mg/dL


 


Est GFR (African Amer)  > 60    


 


Est GFR (Non-Af Amer)  > 60    


 


Random Glucose  80    ()  mg/dL


 


Lactic Acid     (0.7-2.1)  mmol/L


 


Calcium  7.4 L    (8.6-10.4)  mg/dl


 


Phosphorus  3.2    (2.5-4.5)  mg/dL


 


Magnesium  2.0    (1.6-2.3)  mg/dL


 


Total Bilirubin  1.3    (0.2-1.3)  mg/dL


 


AST  47 H    (14-36)  U/L


 


ALT  41    (9-52)  U/L


 


Alkaline Phosphatase  70    ()  U/L


 


Total Protein  6.2 L    (6.3-8.3)  g/dL


 


Albumin  3.2 L D    (3.5-5.0)  g/dL


 


Globulin  3.1    (2.2-3.9)  gm/dL


 


Albumin/Globulin Ratio  1.0    (1.0-2.1)  


 


Influenza Typ A,B (EIA)    Negative for flu a/b  (NEGATIVE)  














  03/21/18 Range/Units





  15:36 


 


WBC   (4.8-10.8)  K/uL


 


RBC   (3.80-5.20)  Mil/uL


 


Hgb   (11.0-16.0)  g/dL


 


Hct   (34.0-47.0)  %


 


MCV   (81.0-99.0)  fL


 


MCH   (27.0-31.0)  pg


 


MCHC   (33.0-37.0)  g/dL


 


RDW   (11.5-14.5)  %


 


Plt Count   (130-400)  K/uL


 


MPV   (7.2-11.7)  fL


 


Neut % (Auto)   (50.0-75.0)  %


 


Lymph % (Auto)   (20.0-40.0)  %


 


Mono % (Auto)   (0.0-10.0)  %


 


Eos % (Auto)   (0.0-4.0)  %


 


Baso % (Auto)   (0.0-2.0)  %


 


Neut # (Auto)   (1.8-7.0)  K/uL


 


Lymph # (Auto)   (1.0-4.3)  K/uL


 


Mono # (Auto)   (0.0-0.8)  K/uL


 


Eos # (Auto)   (0.0-0.7)  K/uL


 


Baso # (Auto)   (0.0-0.2)  K/uL


 


Sodium   (132-148)  mmol/L


 


Potassium   (3.6-5.2)  mmol/L


 


Chloride   ()  mmol/L


 


Carbon Dioxide   (22-30)  mmol/L


 


Anion Gap   (10-20)  


 


BUN   (7-17)  mg/dL


 


Creatinine   (0.7-1.2)  mg/dL


 


Est GFR (African Amer)   


 


Est GFR (Non-Af Amer)   


 


Random Glucose   ()  mg/dL


 


Lactic Acid  1.9  (0.7-2.1)  mmol/L


 


Calcium   (8.6-10.4)  mg/dl


 


Phosphorus   (2.5-4.5)  mg/dL


 


Magnesium   (1.6-2.3)  mg/dL


 


Total Bilirubin   (0.2-1.3)  mg/dL


 


AST   (14-36)  U/L


 


ALT   (9-52)  U/L


 


Alkaline Phosphatase   ()  U/L


 


Total Protein   (6.3-8.3)  g/dL


 


Albumin   (3.5-5.0)  g/dL


 


Globulin   (2.2-3.9)  gm/dL


 


Albumin/Globulin Ratio   (1.0-2.1)  


 


Influenza Typ A,B (EIA)   (NEGATIVE)  








 Laboratory Results - last 24 hr











  03/21/18 03/21/18 03/22/18





  15:36 15:56 05:50


 


WBC    10.1  D


 


RBC    3.51 L


 


Hgb    10.3 L D


 


Hct    30.4 L


 


MCV    86.7  D


 


MCH    29.4


 


MCHC    33.9


 


RDW    14.3


 


Plt Count    214  D


 


MPV    8.7


 


Neut % (Auto)    73.5


 


Lymph % (Auto)    18.8 L


 


Mono % (Auto)    7.2


 


Eos % (Auto)    0.2


 


Baso % (Auto)    0.3


 


Neut # (Auto)    7.5 H


 


Lymph # (Auto)    1.9


 


Mono # (Auto)    0.7


 


Eos # (Auto)    0.0


 


Baso # (Auto)    0.0


 


Sodium   


 


Potassium   


 


Chloride   


 


Carbon Dioxide   


 


Anion Gap   


 


BUN   


 


Creatinine   


 


Est GFR ( Amer)   


 


Est GFR (Non-Af Amer)   


 


Random Glucose   


 


Lactic Acid  1.9  


 


Calcium   


 


Phosphorus   


 


Magnesium   


 


Total Bilirubin   


 


AST   


 


ALT   


 


Alkaline Phosphatase   


 


Total Protein   


 


Albumin   


 


Globulin   


 


Albumin/Globulin Ratio   


 


Influenza Typ A,B (EIA)   Negative for flu a/b 














  03/22/18





  05:57


 


WBC 


 


RBC 


 


Hgb 


 


Hct 


 


MCV 


 


MCH 


 


MCHC 


 


RDW 


 


Plt Count 


 


MPV 


 


Neut % (Auto) 


 


Lymph % (Auto) 


 


Mono % (Auto) 


 


Eos % (Auto) 


 


Baso % (Auto) 


 


Neut # (Auto) 


 


Lymph # (Auto) 


 


Mono # (Auto) 


 


Eos # (Auto) 


 


Baso # (Auto) 


 


Sodium  139


 


Potassium  3.4 L


 


Chloride  110 H


 


Carbon Dioxide  19 L


 


Anion Gap  14


 


BUN  5 L


 


Creatinine  0.7


 


Est GFR ( Amer)  > 60


 


Est GFR (Non-Af Amer)  > 60


 


Random Glucose  80


 


Lactic Acid 


 


Calcium  7.4 L


 


Phosphorus  3.2


 


Magnesium  2.0


 


Total Bilirubin  1.3


 


AST  47 H


 


ALT  41


 


Alkaline Phosphatase  70


 


Total Protein  6.2 L


 


Albumin  3.2 L D


 


Globulin  3.1


 


Albumin/Globulin Ratio  1.0


 


Influenza Typ A,B (EIA) 











Fingerstick Blood Sugar Results: 215





Critical Care Progress Note





- Nutrition


Nutrition: 


 Nutrition











 Category Date Time Status


 


 Regular Diet [DIET] Diets  03/21/18 Breakfast Active














Assessment/Plan





- Assessment and Plan (Free Text)


Assessment: 





Patient is a 37 year old female with past medical history of gastritis 

presented to the ED with 2 days of nausea, vomiting, abdominal pain; also with 

seizure activity, traumatic shoulder injury, sepsis. Admitted to the ICU for 

closer monitoring.





Neurology:


-Patient is AAOX3, following commands, No more seizures


-Keppra 500mg IV Q12H, Will check Keppra level


-Went for MRI yesterday which was negative


-Will order MRA of the head 


-Tongue laceration: Lidocaine 2% Viscous 15 ml PO Swish and Spit ACHS


-Neurology on consult, Dr Marcano, help appreciated 


-Plan to downgrade to Med/surg today pending MRA results





Cardiology: 


-Patient is hemodynamically stable


-Echo showed Trace tricuspid regurg





Respiratory:


-Patient with RLL pneumonia on CT chest and CXR


-Antibiotics: Rocephin and Azithromycin


-Influenza negative


-F/U mycoplasma, legionella, strep pneumonia





Renal:


-Lactic acidosis secondary to sepsis, now normalized


-Potassium 3.4, repleted


-Continue to monitor electrolytes





ID: 


-Patient presented with severe sepsis, lactate normalized


-Presumed source is community acquired pneumonia/aspiration pneumonia


-Antibiotics: Rocephin 2gm IV daily and Azithromycin 500mg IV daily


-Continue Florastor 250 mg PO 2x/day


-F/U blood cultures, urine cultures 


-Patient recently came from [facility]





Musculoskeletal:


-Patient presented with traumatic shoulder injury


-Closed reduction was performed in the ER


-Shoulder XRAY showed acute comminuted displaced fracture in greater tuberosity 

of the humerus


-CT shoulder ordered


-Pain control: Morphin 2mg Q4H prn pain 


-Ortho on consult, help appreciated





GI/DVT ppx:


-Lovenox 40mg SC daily


-No GI ppx indicated at this time








<Edilia Worrell - Last Filed: 03/22/18 17:24>





CCU Objective





- Vital Signs / Intake & Output


Vital Signs (Last 4 hours): 


Vital Signs











  Temp


 


 03/22/18 16:00  99.6 F











Intake and Output (Last 8hrs): 


 Intake & Output











 03/22/18 03/22/18 03/22/18





 06:59 14:59 22:59


 


Intake Total 1480 1450 300


 


Output Total 500 650 400


 


Balance 980 800 -100


 


Weight 183 lb 11.2 oz  


 


Intake:   


 


  Intake, IV Amount 1300 1050 300


 


    Left Upper arm 1300 1050 300


 


  Oral 180 400 


 


Output:   


 


  Urine 500 650 400


 


    Urine, Voided 500 650 400


 


Other:   


 


  # Voids   


 


    Urine, Voided 1 1 1














- Medications


Active Medications: 


Active Medications











Generic Name Dose Route Start Last Admin





  Trade Name Freq  PRN Reason Stop Dose Admin


 


Enoxaparin Sodium  40 mg  03/21/18 10:00  03/22/18 09:44





  Lovenox  SC   40 mg





  DAILY [facility]   Administration


 


Azithromycin 500 mg/ Sodium  250 mls @ 250 mls/hr  03/21/18 10:00  03/22/18 09:

45





  Chloride  IVPB   250 mls/hr





  DAILY [facility]   Administration





  Protocol   


 


Levetiracetam 500 mg/ Sodium  105 mls @ 420 mls/hr  03/21/18 17:00  03/22/18 05:

17





  Chloride  IVPB   420 mls/hr





  Q12H [facility]   Administration


 


Lactated Ringer's  1,000 mls @ 100 mls/hr  03/22/18 09:15  03/22/18 09:44





  Lactated Ringer's  IV   100 mls/hr





  .Q10H [facility]   Administration


 


Piperacillin Sod/Tazobactam Sod  3.375 gm in 50 mls @ 200 mls/hr  03/22/18 18:

00  





  Zosyn 3.375 Gm Iv Premix  IVPB   





  Q6H [facility]   





  Protocol   


 


Morphine Sulfate  2 mg  03/22/18 12:00  03/22/18 12:37





  Morphine  IVP   2 mg





  Q4 PRN   Administration





  Pain, moderate (4-7)   


 


Potassium Chloride  40 meq  03/22/18 17:30  





  K-Dur 20 Meq Er Tab  PO  03/23/18 11:31  





  Q6H [facility]   


 


Saccharomyces Boulardii  250 mg  03/22/18 18:00  





  Florastor  PO   





  BID [facility]   














- Patient Studies


Lab Studies: 


 Microbiology Studies











 03/21/18 06:49 MRSA Culture (Admit) - Final





 Nose    MRSA NOT DETECTED








 Lab Studies











  03/22/18 03/22/18 03/22/18 Range/Units





  15:24 05:57 05:50 


 


WBC    10.1  D  (4.8-10.8)  K/uL


 


RBC    3.51 L  (3.80-5.20)  Mil/uL


 


Hgb    10.3 L D  (11.0-16.0)  g/dL


 


Hct    30.4 L  (34.0-47.0)  %


 


MCV    86.7  D  (81.0-99.0)  fL


 


MCH    29.4  (27.0-31.0)  pg


 


MCHC    33.9  (33.0-37.0)  g/dL


 


RDW    14.3  (11.5-14.5)  %


 


Plt Count    214  D  (130-400)  K/uL


 


MPV    8.7  (7.2-11.7)  fL


 


Neut % (Auto)    73.5  (50.0-75.0)  %


 


Lymph % (Auto)    18.8 L  (20.0-40.0)  %


 


Mono % (Auto)    7.2  (0.0-10.0)  %


 


Eos % (Auto)    0.2  (0.0-4.0)  %


 


Baso % (Auto)    0.3  (0.0-2.0)  %


 


Neut # (Auto)    7.5 H  (1.8-7.0)  K/uL


 


Lymph # (Auto)    1.9  (1.0-4.3)  K/uL


 


Mono # (Auto)    0.7  (0.0-0.8)  K/uL


 


Eos # (Auto)    0.0  (0.0-0.7)  K/uL


 


Baso # (Auto)    0.0  (0.0-0.2)  K/uL


 


Sodium   139   (132-148)  mmol/L


 


Potassium   3.4 L   (3.6-5.2)  mmol/L


 


Chloride   110 H   ()  mmol/L


 


Carbon Dioxide   19 L   (22-30)  mmol/L


 


Anion Gap   14   (10-20)  


 


BUN   5 L   (7-17)  mg/dL


 


Creatinine   0.7   (0.7-1.2)  mg/dL


 


Est GFR (African Amer)   > 60   


 


Est GFR (Non-Af Amer)   > 60   


 


Random Glucose   80   ()  mg/dL


 


Calcium   7.4 L   (8.6-10.4)  mg/dl


 


Phosphorus   3.2   (2.5-4.5)  mg/dL


 


Magnesium   2.0   (1.6-2.3)  mg/dL


 


Total Bilirubin   1.3   (0.2-1.3)  mg/dL


 


AST   47 H   (14-36)  U/L


 


ALT   41   (9-52)  U/L


 


Alkaline Phosphatase   70   ()  U/L


 


Total Protein   6.2 L   (6.3-8.3)  g/dL


 


Albumin   3.2 L D   (3.5-5.0)  g/dL


 


Globulin   3.1   (2.2-3.9)  gm/dL


 


Albumin/Globulin Ratio   1.0   (1.0-2.1)  


 


Urine Opiates Screen  Negative    (NEGATIVE)  


 


Urine Methadone Screen  Negative    (NEGATIVE)  


 


Ur Barbiturates Screen  Negative    (NEGATIVE)  


 


Ur Phencyclidine Scrn  Negative    (NEGATIVE)  


 


Ur Amphetamines Screen  Negative    (NEGATIVE)  


 


U Benzodiazepines Scrn  Negative    (NEGATIVE)  


 


U Oth Cocaine Metabols  Negative    (NEGATIVE)  


 


U Cannabinoids Screen  Negative    (NEGATIVE)  








 Laboratory Results - last 24 hr











  03/22/18 03/22/18 03/22/18





  05:50 05:57 15:24


 


WBC  10.1  D  


 


RBC  3.51 L  


 


Hgb  10.3 L D  


 


Hct  30.4 L  


 


MCV  86.7  D  


 


MCH  29.4  


 


MCHC  33.9  


 


RDW  14.3  


 


Plt Count  214  D  


 


MPV  8.7  


 


Neut % (Auto)  73.5  


 


Lymph % (Auto)  18.8 L  


 


Mono % (Auto)  7.2  


 


Eos % (Auto)  0.2  


 


Baso % (Auto)  0.3  


 


Neut # (Auto)  7.5 H  


 


Lymph # (Auto)  1.9  


 


Mono # (Auto)  0.7  


 


Eos # (Auto)  0.0  


 


Baso # (Auto)  0.0  


 


Sodium   139 


 


Potassium   3.4 L 


 


Chloride   110 H 


 


Carbon Dioxide   19 L 


 


Anion Gap   14 


 


BUN   5 L 


 


Creatinine   0.7 


 


Est GFR ( Amer)   > 60 


 


Est GFR (Non-Af Amer)   > 60 


 


Random Glucose   80 


 


Calcium   7.4 L 


 


Phosphorus   3.2 


 


Magnesium   2.0 


 


Total Bilirubin   1.3 


 


AST   47 H 


 


ALT   41 


 


Alkaline Phosphatase   70 


 


Total Protein   6.2 L 


 


Albumin   3.2 L D 


 


Globulin   3.1 


 


Albumin/Globulin Ratio   1.0 


 


Urine Opiates Screen    Negative


 


Urine Methadone Screen    Negative


 


Ur Barbiturates Screen    Negative


 


Ur Phencyclidine Scrn    Negative


 


Ur Amphetamines Screen    Negative


 


U Benzodiazepines Scrn    Negative


 


U Oth Cocaine Metabols    Negative


 


U Cannabinoids Screen    Negative














Critical Care Progress Note





- Nutrition


Nutrition: 


 Nutrition











 Category Date Time Status


 


 Regular Diet [DIET] Diets  03/21/18 Breakfast Active














Assessment/Plan





- Assessment and Plan (Free Text)


Assessment: 


Above resident note reviewed and verified. Patient with h/o seizures after 

pregnancy, h/o no follow up presents to [facility] with dx of seizures.


-continue keppra


-r/o sagital vein thrombosis, MRV/MRA


-?aspiration: switcht from ceftriaxone to zosyn


-aspiration precautions


-right shoulder: continue sling and obtain orthopedic surgery eval


-no central line, no foleys


-remains hemodynamically stable





-continue oral diet if no anticipated procedures.








- Date & Time


Date: 03/22/18


Time: 11:35

## 2018-03-23 LAB
ALBUMIN SERPL-MCNC: 3.7 G/DL (ref 3.5–5)
ALBUMIN/GLOB SERPL: 1 {RATIO} (ref 1–2.1)
ALT SERPL-CCNC: 46 U/L (ref 9–52)
AST SERPL-CCNC: 86 U/L (ref 14–36)
BASOPHILS # BLD AUTO: 0 K/UL (ref 0–0.2)
BASOPHILS NFR BLD: 0.4 % (ref 0–2)
BUN SERPL-MCNC: 6 MG/DL (ref 7–17)
CALCIUM SERPL-MCNC: 9 MG/DL (ref 8.6–10.4)
EOSINOPHIL # BLD AUTO: 0.1 K/UL (ref 0–0.7)
EOSINOPHIL NFR BLD: 0.9 % (ref 0–4)
ERYTHROCYTE [DISTWIDTH] IN BLOOD BY AUTOMATED COUNT: 14.6 % (ref 11.5–14.5)
GFR NON-AFRICAN AMERICAN: > 60
HGB BLD-MCNC: 11.3 G/DL (ref 11–16)
LYMPHOCYTES # BLD AUTO: 2.3 K/UL (ref 1–4.3)
LYMPHOCYTES NFR BLD AUTO: 22.4 % (ref 20–40)
MCH RBC QN AUTO: 29.8 PG (ref 27–31)
MCHC RBC AUTO-ENTMCNC: 34 G/DL (ref 33–37)
MCV RBC AUTO: 87.5 FL (ref 81–99)
MONOCYTES # BLD: 0.9 K/UL (ref 0–0.8)
MONOCYTES NFR BLD: 8.8 % (ref 0–10)
NEUTROPHILS # BLD: 6.8 K/UL (ref 1.8–7)
NEUTROPHILS NFR BLD AUTO: 67.5 % (ref 50–75)
NRBC BLD AUTO-RTO: 0.1 % (ref 0–2)
PLATELET # BLD: 206 K/UL (ref 130–400)
PMV BLD AUTO: 9 FL (ref 7.2–11.7)
RBC # BLD AUTO: 3.79 MIL/UL (ref 3.8–5.2)
WBC # BLD AUTO: 10.1 K/UL (ref 4.8–10.8)

## 2018-03-23 RX ADMIN — TAZOBACTAM SODIUM AND PIPERACILLIN SODIUM SCH MLS/HR: 375; 3 INJECTION, SOLUTION INTRAVENOUS at 17:47

## 2018-03-23 RX ADMIN — TAZOBACTAM SODIUM AND PIPERACILLIN SODIUM SCH MLS/HR: 375; 3 INJECTION, SOLUTION INTRAVENOUS at 05:47

## 2018-03-23 RX ADMIN — TAZOBACTAM SODIUM AND PIPERACILLIN SODIUM SCH MLS/HR: 375; 3 INJECTION, SOLUTION INTRAVENOUS at 00:06

## 2018-03-23 RX ADMIN — Medication SCH MG: at 10:13

## 2018-03-23 RX ADMIN — POTASSIUM CHLORIDE SCH MEQ: 20 TABLET, EXTENDED RELEASE ORAL at 05:45

## 2018-03-23 RX ADMIN — TAZOBACTAM SODIUM AND PIPERACILLIN SODIUM SCH MLS/HR: 375; 3 INJECTION, SOLUTION INTRAVENOUS at 12:44

## 2018-03-23 RX ADMIN — Medication SCH MG: at 17:47

## 2018-03-23 NOTE — CT
PROCEDURE:  CTA HEAD AND NECK WITH CONTRAST



HISTORY:

follow up MRI, r/o bilateral ophthalmic artery ane



COMPARISON:

MRA head without contrast from 03/20 2018. 



TECHNIQUE:

Initial noncontrast head CT was performed. Subsequently, CT angiogram 

of the head and neck were performed after the intravenous 

administration of 80 mL of Omnipaque 350.  Contiguous 1.5mm thick 

images were obtained in the axial plane of the neck. 2-D coronal and 

sagittal MPR images were obtained. Imaging postprocessing was 

performed with 3-D images also obtained. A delayed contrast head CT 

was also obtained. This CT exam was performed using one or more of 

the following dose reduction techniques: Automated exposure control, 

adjustment of the mA and/or kV according to patient size, and/or use 

of iterative reconstruction technique.



Contrast dose: 100 mL Omnipaque 350



Radiation dose:



Total exam DLP = 565.47 mGy-cm.



FINDINGS:



HEAD:

Right:



 The intracranial internal carotid artery, and anterior and middle 

cerebral arteries are widely patent.



Left:



The intracranial internal carotid artery, and anterior and middle 

cerebral arteries are widely patent. 



Posterior circulation: 



The visualized intracranial vertebral arteries, basilar artery and 

posterior cerebral arteries are widely patent.



There is no endoluminal filling defect to suggest thrombus. There is 

no intracranial saccular aneurysm.



NECK:

There is a three vessel aortic arch. There is no stenosis at the 

origins of the great vessels at the level of the aortic arch.



Right Carotid:



On the right, the common carotid, internal carotid and external 

carotid arteries are widely patent.



There is no hemodynamically significant stenosis in the internal 

carotid artery by NASCET criteria.



Left Carotid:



On the left, the common carotid, internal carotid and external 

carotid arteries are widely patent.



There is no hemodynamically significant stenosis in the internal 

carotid artery by NASCET criteria.



The vertebral arteries are widely patent. 



The visualized soft tissues of the neck are normal. The visualized 

brain and cervical spine are within normal limits.



The lung apices are clear. 



IMPRESSION:

Normal CTA of the head and neck.  No evidence of occlusion, definite 

significant stenosis or saccular aneurysm.

## 2018-03-23 NOTE — CARD
--------------- APPROVED REPORT --------------





EKG Measurement

Heart [id number]

MD [id number]

[id number]

[id number]

[id number]



<Conclusion>

Sinus tachycardia

Nonspecific ST abnormality

Abnormal ECG

## 2018-03-23 NOTE — CP.PCM.PN
Subjective





- Date & Time of Evaluation


Date of Evaluation: 03/23/18


Time of Evaluation: 10:01





- Subjective


Subjective: 





Patient says pain in her right shoulder is much better today. Denies numbness/

tingling. 





Objective





- Vital Signs/Intake and Output


Vital Signs (last 24 hours): 


 











Temp Pulse Resp BP Pulse Ox


 


 99.1 F   83   20   114/72   99 


 


 03/23/18 08:00  03/23/18 08:00  03/23/18 08:00  03/23/18 08:00  03/23/18 08:00








Intake and Output: 


 











 03/23/18 03/23/18





 06:59 18:59


 


Intake Total 980 


 


Output Total 500 


 


Balance 480 














- Medications


Medications: 


 Current Medications





Enoxaparin Sodium (Lovenox)  40 mg SC DAILY [facility]


   Last Admin: 03/22/18 09:44 Dose:  40 mg


Azithromycin 500 mg/ Sodium (Chloride)  250 mls @ 250 mls/hr IVPB DAILY [facility]


   PRN Reason: Protocol


   Last Admin: 03/22/18 09:45 Dose:  250 mls/hr


Lactated Ringer's (Lactated Ringer's)  1,000 mls @ 100 mls/hr IV .Q10H [facility]


   Last Admin: 03/23/18 04:39 Dose:  Not Given


Piperacillin Sod/Tazobactam Sod (Zosyn 3.375 Gm Iv Premix)  3.375 gm in 50 mls 

@ 200 mls/hr IVPB Q6H [facility]


   PRN Reason: Protocol


   Last Admin: 03/23/18 05:47 Dose:  200 mls/hr


Levetiracetam (Keppra)  750 mg PO Q12H [facility]


Morphine Sulfate (Morphine)  2 mg IVP Q4 PRN


   PRN Reason: Pain, moderate (4-7)


   Last Admin: 03/22/18 20:30 Dose:  2 mg


Potassium Chloride (K-Dur 20 Meq Er Tab)  40 meq PO Q6H [facility]


   Stop: 03/23/18 11:31


   Last Admin: 03/23/18 05:45 Dose:  40 meq


Saccharomyces Boulardii (Florastor)  250 mg PO BID [facility]


   Last Admin: 03/22/18 17:22 Dose:  250 mg











- Labs


Labs: 


 





 03/23/18 06:17 





 03/23/18 06:17 











- Extremities Exam


Additional comments: 





RUE: +ROM fingers/thumb/wrist 5/5 strength, sensation intact to rad/med/ulnar/

ax n. +Radial pulse, immob intact , noted swelling





Assessment and Plan


(1) Closed fracture dislocation of right shoulder joint


Assessment & Plan: 


Case d/w Dr. Worrell


patient with pneumonia on admission, likely aspiration, will need to delay 

surgery for at least 7 days prior to general anesthesia


shoulder immobilizer


ice


pain medication


PT/OT


NWB RUE


patient to f/u Dr. Alvarado office upon discharge, call for appointment to 

schedule arthroscopy/ORIF as outpatient


discussed at length with patient and  regarding need for delay in surgery

, understand and agree to plan


d/w Dr. Alvarado, agrees with above


Status: Acute

## 2018-03-23 NOTE — CP.PCM.PN
Subjective





- Date & Time of Evaluation


Date of Evaluation: 03/23/18


Time of Evaluation: 06:45





- Subjective


Subjective: 





Ms. Patterson was seen and examined at the bedside at the bedside in ICU.  She is 

alert, oriented in all spheres. He denies any headache, dizziness, 

lightheadedness, blurred vision, diplopia, or any seizure-like activity. She is 

able to follow simple commands with right arm with minimal movement due to 

fracture and left arm due to swelling. She is able to move bilateral lower 

extremities spontaneously. MRA of the head showed borderline fibromuscular 

dysplasia pattern involving the right greater than left posterior  arteries. 

Further, bilateral infundibuli are favored over bilateral opthalmic artery  

aneurysms. There was no untoward events overnight.





Objective





- Vital Signs/Intake and Output


Vital Signs (last 24 hours): 


 











Temp Pulse Resp BP Pulse Ox


 


 98.2 F   82   19   117/74   100 


 


 03/23/18 00:00  03/22/18 21:23  03/22/18 18:00  03/22/18 18:00  03/22/18 18:00








Intake and Output: 


 











 03/22/18 03/23/18





 18:59 06:59


 


Intake Total 2125 980


 


Output Total 1600 500


 


Balance 525 480














- Medications


Medications: 


 Current Medications





Enoxaparin Sodium (Lovenox)  40 mg SC DAILY [facility]


   Last Admin: 03/22/18 09:44 Dose:  40 mg


Azithromycin 500 mg/ Sodium (Chloride)  250 mls @ 250 mls/hr IVPB DAILY [facility]


   PRN Reason: Protocol


   Last Admin: 03/22/18 09:45 Dose:  250 mls/hr


Levetiracetam 500 mg/ Sodium (Chloride)  105 mls @ 420 mls/hr IVPB Q12H [facility]


   Last Admin: 03/23/18 05:44 Dose:  420 mls/hr


Lactated Ringer's (Lactated Ringer's)  1,000 mls @ 100 mls/hr IV .Q10H [facility]


   Last Admin: 03/23/18 04:39 Dose:  Not Given


Piperacillin Sod/Tazobactam Sod (Zosyn 3.375 Gm Iv Premix)  3.375 gm in 50 mls 

@ 200 mls/hr IVPB Q6H [facility]


   PRN Reason: Protocol


   Last Admin: 03/23/18 05:47 Dose:  200 mls/hr


Morphine Sulfate (Morphine)  2 mg IVP Q4 PRN


   PRN Reason: Pain, moderate (4-7)


   Last Admin: 03/22/18 20:30 Dose:  2 mg


Potassium Chloride (K-Dur 20 Meq Er Tab)  40 meq PO Q6H [facility]


   Stop: 03/23/18 11:31


   Last Admin: 03/23/18 05:45 Dose:  40 meq


Saccharomyces Boulardii (Florastor)  250 mg PO BID [facility]


   Last Admin: 03/22/18 17:22 Dose:  250 mg











- Labs


Labs: 


 





 03/23/18 06:17 





 03/23/18 06:17 











- Constitutional


Appears: No Acute Distress





- Head Exam


Head Exam: NORMAL INSPECTION





- Neurological Exam


Neurological Exam: Alert, Awake, Oriented x3


Neuro motor strength exam: Left Upper Extremity: 3, Right Upper Extremity: 3, 

Left Lower Extremity: 5, Right Lower Extremity: 5


Additional comments: 





She is alert, oriented x3, follows simple commands. Sensation remains intact





Assessment and Plan


(1) Seizures


Assessment & Plan: 


Case discussed with Dr. Zhang, continue all current medical regimen. Recommend 

CTA of the head and neck and treat any underlying electrolyte abnormalities 

including liver enzymes.


Status: Acute

## 2018-03-23 NOTE — CP.PCM.PN
Subjective





- Date & Time of Evaluation


Date of Evaluation: 03/23/18


Time of Evaluation: 10:40





- Subjective


Subjective: 





Hospitalist Progress Note





Patient was seen and examined at 10:40 AM 3/23/18 ICU Bed 18





37 year old female who was admitted morning of 3/21/18 s/p fall and was found 

to have Seizure, Right Shoulder Dislocation, and RLL Pneumonia. Her Right 

Shoulder was reduced in the emergency room and she was then admitted to the ICU 

for further treatment and evaluation.





Upon ROS:


Right Shoulder pain


Right lateral tongue pain


Moved her bowels and they were normal


NO chest pain


NO SOB/Cough/Wheezing


NO abdominal pain


NO n/v/d/c


NO other complaints





Exam:


General: AAOX3, NAD


HEENT: NCA, EOMI, PERRLA, NO cervical/supraclavicular/submandibular 

lymphadenopathy, NO pharyngeal erythema/exudate, Nasal Turbinates are 

nonerythematous/nonedematous, Oral Mucosa is moist, Right Lateral Tongue 

Laceration and Left Lateral Tongue Laceration starting to scab over


Cardio: NS1 and NS2, NO M/R/G


Resp: CTA B/L, Faint Bibasilar Rales on exam 


GI: BSx4, Soft, NT, NO HSM, NO guarding/rebound tenderness


Ext: Pulses are strong and equal, Capillary Refill is 2 seconds, NO edema, 

Right Arm in Flexion in Sling


Neuro: CN II through XII are grossly intact





Assessment and Plan:





1). Sepsis Secondary to RLL Pneumonia


CT Chest 3/21/18 showed evidence of RLL


Azithromycin 500 mg IV 1x/day (3/21/18)


Ceftriaxone 2 gm IV Q24H (3/21/18)


Blood Culture is negative to date


Urine Culture shows NO growth


F/U Mycoplasma IgG, IgM


F/U Urine Strep Ag


Urine Legionella is negative


Rapid Influenza is negative





Status: Acute





2). Seizure


Keppra 500 mg PO 2x/day


CT Head 3/21/18: NO acute findings


Brain MRI 3/21/18: normal pre and post contrast enhanced brain


MRA Brain 3/22/18 shows borderline fibromuscular dysplasia pattern involving 

the right > left, bilateral infundibuli are favored over small bilateral 

opthalmic artery aneurysms


F/U CTA Head and Neck to rule out ophthalmic artery aneurysms


Neurology Dr. Marcano





Status: Acute





3). Right Shoulder Dislocation/Right Humerus Fracture


Reduced in ER at the time of admission


Right Shoulder CT 3/22/18 shows communuted displaced greater tuberosity fracture

, successful closed reduction of anterior glenohumeral dislocation


Due to RLL Pneumonia, patient is not cleared for surgery requiring general 

anesthesia. She will need to complete antibiotic course and then follow up with 

Dr. Alvarado in his office to schedule surgery.


She will need to keep right shoulder immobilizer on at all times 


Orthopedics Dr. Alvarado





Status: Acute





4). Tongue Laceration


Secondary to the seizure


Lidocaine 2% Viscous 15 ml PO Swish and Spit ACHS


 


Status: Acute





5). Prohylaxis


Lovenox 40 mg SC 1x/day 


Protonix 40 mg IV 1x/day


Florastor 250 mg PO 2x/day


Regular Diet





Status: Acute





Benton Worrell D.O.





Objective





- Vital Signs/Intake and Output


Vital Signs (last 24 hours): 


 











Temp Pulse Resp BP Pulse Ox


 


 99.1 F   83   20   114/72   99 


 


 03/23/18 08:00  03/23/18 08:00  03/23/18 08:00  03/23/18 08:00  03/23/18 08:00








Intake and Output: 


 











 03/23/18 03/23/18





 06:59 18:59


 


Intake Total 980 


 


Output Total 500 


 


Balance 480 














- Medications


Medications: 


 Current Medications





Enoxaparin Sodium (Lovenox)  40 mg SC DAILY [facility]


   Last Admin: 03/22/18 09:44 Dose:  40 mg


Azithromycin 500 mg/ Sodium (Chloride)  250 mls @ 250 mls/hr IVPB DAILY [facility]


   PRN Reason: Protocol


   Last Admin: 03/23/18 10:13 Dose:  250 mls/hr


Lactated Ringer's (Lactated Ringer's)  1,000 mls @ 100 mls/hr IV .Q10H [facility]


   Last Admin: 03/23/18 04:39 Dose:  Not Given


Piperacillin Sod/Tazobactam Sod (Zosyn 3.375 Gm Iv Premix)  3.375 gm in 50 mls 

@ 200 mls/hr IVPB Q6H [facility]


   PRN Reason: Protocol


   Last Admin: 03/23/18 05:47 Dose:  200 mls/hr


Levetiracetam (Keppra)  750 mg PO Q12H [facility]


   Last Admin: 03/23/18 10:54 Dose:  750 mg


Morphine Sulfate (Morphine)  2 mg IVP Q4 PRN


   PRN Reason: Pain, moderate (4-7)


   Last Admin: 03/22/18 20:30 Dose:  2 mg


Saccharomyces Boulardii (Florastor)  250 mg PO BID [facility]


   Last Admin: 03/23/18 10:13 Dose:  250 mg











- Labs


Labs: 


 





 03/23/18 06:17 





 03/23/18 06:17

## 2018-03-24 VITALS — RESPIRATION RATE: 18 BRPM

## 2018-03-24 LAB
ALBUMIN SERPL-MCNC: 3.8 G/DL (ref 3.5–5)
ALBUMIN/GLOB SERPL: 1 {RATIO} (ref 1–2.1)
ALT SERPL-CCNC: 43 U/L (ref 9–52)
AST SERPL-CCNC: 102 U/L (ref 14–36)
BASOPHILS # BLD AUTO: 0 K/UL (ref 0–0.2)
BASOPHILS NFR BLD: 0.5 % (ref 0–2)
BUN SERPL-MCNC: 6 MG/DL (ref 7–17)
CALCIUM SERPL-MCNC: 8.8 MG/DL (ref 8.6–10.4)
EOSINOPHIL # BLD AUTO: 0.1 K/UL (ref 0–0.7)
EOSINOPHIL NFR BLD: 2.3 % (ref 0–4)
ERYTHROCYTE [DISTWIDTH] IN BLOOD BY AUTOMATED COUNT: 14 % (ref 11.5–14.5)
GFR NON-AFRICAN AMERICAN: > 60
HGB BLD-MCNC: 9.1 G/DL (ref 11–16)
LYMPHOCYTES # BLD AUTO: 1.5 K/UL (ref 1–4.3)
LYMPHOCYTES NFR BLD AUTO: 23.2 % (ref 20–40)
MCH RBC QN AUTO: 29.7 PG (ref 27–31)
MCHC RBC AUTO-ENTMCNC: 33.9 G/DL (ref 33–37)
MCV RBC AUTO: 87.8 FL (ref 81–99)
MONOCYTES # BLD: 0.4 K/UL (ref 0–0.8)
MONOCYTES NFR BLD: 6.5 % (ref 0–10)
NEUTROPHILS # BLD: 4.2 K/UL (ref 1.8–7)
NEUTROPHILS NFR BLD AUTO: 67.5 % (ref 50–75)
NRBC BLD AUTO-RTO: 0 % (ref 0–2)
PLATELET # BLD: 183 K/UL (ref 130–400)
PMV BLD AUTO: 9.4 FL (ref 7.2–11.7)
RBC # BLD AUTO: 3.05 MIL/UL (ref 3.8–5.2)
WBC # BLD AUTO: 6.3 K/UL (ref 4.8–10.8)

## 2018-03-24 RX ADMIN — Medication SCH MG: at 17:04

## 2018-03-24 RX ADMIN — TAZOBACTAM SODIUM AND PIPERACILLIN SODIUM SCH MLS/HR: 375; 3 INJECTION, SOLUTION INTRAVENOUS at 17:04

## 2018-03-24 RX ADMIN — TAZOBACTAM SODIUM AND PIPERACILLIN SODIUM SCH MLS/HR: 375; 3 INJECTION, SOLUTION INTRAVENOUS at 01:39

## 2018-03-24 RX ADMIN — TAZOBACTAM SODIUM AND PIPERACILLIN SODIUM SCH MLS/HR: 375; 3 INJECTION, SOLUTION INTRAVENOUS at 11:42

## 2018-03-24 RX ADMIN — Medication SCH MG: at 09:10

## 2018-03-24 RX ADMIN — ENOXAPARIN SODIUM SCH MG: 40 INJECTION SUBCUTANEOUS at 09:10

## 2018-03-24 RX ADMIN — TAZOBACTAM SODIUM AND PIPERACILLIN SODIUM SCH MLS/HR: 375; 3 INJECTION, SOLUTION INTRAVENOUS at 06:34

## 2018-03-24 RX ADMIN — TAZOBACTAM SODIUM AND PIPERACILLIN SODIUM SCH MLS/HR: 375; 3 INJECTION, SOLUTION INTRAVENOUS at 23:23

## 2018-03-24 NOTE — CP.CCUPN
CCU Objective





- Vital Signs / Intake & Output


Vital Signs (Last 4 hours): 


Vital Signs











  Temp Pulse Resp BP Pulse Ox


 


 03/24/18 16:00  98.4 F  75  18  114/76  100











Intake and Output (Last 8hrs): 


 Intake & Output











 03/24/18 03/24/18 03/24/18





 06:59 14:59 22:59


 


Intake Total 100 2200 


 


Output Total  600 


 


Balance 100 1600 


 


Intake:   


 


  Intake, IV Amount 100 1600 


 


    Left Hand 100 100 


 


    Left Upper arm  1500 


 


  Oral  600 


 


Output:   


 


  Urine  600 


 


    Urine, Voided  600 


 


Other:   


 


  # Bowel Movements  0 














- Physical Exam


Head: Positive for: Atraumatic, Normocephalic


Pupils: Positive for: PERRL


Extroacular Muscles: Positive for: EOMI


Conjunctiva: Positive for: Normal


Mouth: Positive for: Moist Mucous Membranes


Neck: Positive for: Normal Range of Motion


Respiratory/Chest: Positive for: Clear to Auscultation, Good Air Exchange.  

Negative for: Respiratory Distress, Accessory Muscle Use


Cardiovascular: Positive for: Regular Rate and Rhythm, Normal S1, S2.  Negative 

for: Murmurs


Abdomen: Positive for: Normal Bowel Sounds.  Negative for: Tenderness, 

Distention, Peritoneal Signs


Upper Extremity: Positive for: NORMAL PULSES, Tenderness (Right shoulder), 

Swelling


Lower Extremity: Positive for: Normal Inspection


Neurological: Positive for: GCS=15, CN II-XII Intact, Speech Normal


Skin: Positive for: Warm, Dry, Normal Color


Psychiatric: Positive for: Alert, Oriented x 3





- Medications


Active Medications: 


Active Medications











Generic Name Dose Route Start Last Admin





  Trade Name Freq  PRN Reason Stop Dose Admin


 


Enoxaparin Sodium  40 mg  03/21/18 10:00  03/24/18 09:10





  Lovenox  SC   40 mg





  DAILY [facility]   Administration


 


Azithromycin 500 mg/ Sodium  250 mls @ 250 mls/hr  03/21/18 10:00  03/24/18 09:

11





  Chloride  IVPB   250 mls/hr





  DAILY [facility]   Administration





  Protocol   


 


Piperacillin Sod/Tazobactam Sod  3.375 gm in 50 mls @ 200 mls/hr  03/22/18 18:

00  03/24/18 17:04





  Zosyn 3.375 Gm Iv Premix  IVPB   200 mls/hr





  Q6H [facility]   Administration





  Protocol   


 


Levetiracetam  750 mg  03/23/18 09:45  03/24/18 09:10





  Keppra  PO   750 mg





  Q12H [facility]   Administration


 


Morphine Sulfate  2 mg  03/23/18 21:45  03/23/18 21:48





  Morphine  IVP   2 mg





  Q4 PRN   Administration





  Pain, moderate (4-7)   


 


Saccharomyces Boulardii  250 mg  03/22/18 18:00  03/24/18 17:04





  Florastor  PO   250 mg





  BID [facility]   Administration














- Patient Studies


Lab Studies: 


 Microbiology Studies











 03/21/18 Unknown Blood Culture - Preliminary





 Blood-Venous    NO GROWTH AFTER 48 HOURS


 


 03/21/18 04:30 Blood Culture - Preliminary





 Blood-Venous    NO GROWTH AFTER 48 HOURS








 Lab Studies











  03/24/18 03/24/18 Range/Units





  06:15 06:14 


 


WBC  6.3   (4.8-10.8)  K/uL


 


RBC  3.05 L   (3.80-5.20)  Mil/uL


 


Hgb  9.1 L D   (11.0-16.0)  g/dL


 


Hct  26.8 L   (34.0-47.0)  %


 


MCV  87.8   (81.0-99.0)  fL


 


MCH  29.7   (27.0-31.0)  pg


 


MCHC  33.9   (33.0-37.0)  g/dL


 


RDW  14.0   (11.5-14.5)  %


 


Plt Count  183   (130-400)  K/uL


 


MPV  9.4   (7.2-11.7)  fL


 


Neut % (Auto)  67.5   (50.0-75.0)  %


 


Lymph % (Auto)  23.2   (20.0-40.0)  %


 


Mono % (Auto)  6.5   (0.0-10.0)  %


 


Eos % (Auto)  2.3   (0.0-4.0)  %


 


Baso % (Auto)  0.5   (0.0-2.0)  %


 


Neut # (Auto)  4.2   (1.8-7.0)  K/uL


 


Lymph # (Auto)  1.5   (1.0-4.3)  K/uL


 


Mono # (Auto)  0.4   (0.0-0.8)  K/uL


 


Eos # (Auto)  0.1   (0.0-0.7)  K/uL


 


Baso # (Auto)  0.0   (0.0-0.2)  K/uL


 


Sodium   139  (132-148)  mmol/L


 


Potassium   4.4  (3.6-5.2)  mmol/L


 


Chloride   102  ()  mmol/L


 


Carbon Dioxide   26  (22-30)  mmol/L


 


Anion Gap   16  (10-20)  


 


BUN   6 L  (7-17)  mg/dL


 


Creatinine   0.7  (0.7-1.2)  mg/dL


 


Est GFR (African Amer)   > 60  


 


Est GFR (Non-Af Amer)   > 60  


 


Random Glucose   85  ()  mg/dL


 


Calcium   8.8  (8.6-10.4)  mg/dl


 


Total Bilirubin   1.5 H  (0.2-1.3)  mg/dL


 


AST   102 H  (14-36)  U/L


 


ALT   43  (9-52)  U/L


 


Alkaline Phosphatase   63  ()  U/L


 


Total Protein   7.7  (6.3-8.3)  g/dL


 


Albumin   3.8  (3.5-5.0)  g/dL


 


Globulin   3.9  (2.2-3.9)  gm/dL


 


Albumin/Globulin Ratio   1.0  (1.0-2.1)  








 Laboratory Results - last 24 hr











  03/24/18 03/24/18





  06:14 06:15


 


WBC   6.3


 


RBC   3.05 L


 


Hgb   9.1 L D


 


Hct   26.8 L


 


MCV   87.8


 


MCH   29.7


 


MCHC   33.9


 


RDW   14.0


 


Plt Count   183


 


MPV   9.4


 


Neut % (Auto)   67.5


 


Lymph % (Auto)   23.2


 


Mono % (Auto)   6.5


 


Eos % (Auto)   2.3


 


Baso % (Auto)   0.5


 


Neut # (Auto)   4.2


 


Lymph # (Auto)   1.5


 


Mono # (Auto)   0.4


 


Eos # (Auto)   0.1


 


Baso # (Auto)   0.0


 


Sodium  139 


 


Potassium  4.4 


 


Chloride  102 


 


Carbon Dioxide  26 


 


Anion Gap  16 


 


BUN  6 L 


 


Creatinine  0.7 


 


Est GFR ( Amer)  > 60 


 


Est GFR (Non-Af Amer)  > 60 


 


Random Glucose  85 


 


Calcium  8.8 


 


Total Bilirubin  1.5 H 


 


AST  102 H 


 


ALT  43 


 


Alkaline Phosphatase  63 


 


Total Protein  7.7 


 


Albumin  3.8 


 


Globulin  3.9 


 


Albumin/Globulin Ratio  1.0 











Fingerstick Blood Sugar Results: 215





Critical Care Progress Note





- Nutrition


Nutrition: 


 Nutrition











 Category Date Time Status


 


 Regular Diet [DIET] Diets  03/21/18 Breakfast Active

## 2018-03-24 NOTE — CP.PCM.PN
<Jagjit Moreno - Last Filed: 03/24/18 09:38>





Subjective





- Date & Time of Evaluation


Date of Evaluation: 03/24/18


Time of Evaluation: 08:51





- Subjective


Subjective: 





PGY-2 note for Dr. Worrell's ervice:





Pt seen and examined at bedside. Nursing reports no acute events overnight. 





Objective





- Vital Signs/Intake and Output


Vital Signs (last 24 hours): 


 











Temp Pulse Resp BP Pulse Ox


 


 98.6 F   60   18   106/66   100 


 


 03/24/18 08:00  03/24/18 08:00  03/24/18 08:00  03/24/18 08:00  03/24/18 08:00








Intake and Output: 


 











 03/24/18 03/24/18





 06:59 18:59


 


Intake Total 100 1400


 


Output Total  600


 


Balance 100 800














- Medications


Medications: 


 Current Medications





Enoxaparin Sodium (Lovenox)  40 mg SC DAILY [facility]


   Last Admin: 03/22/18 09:44 Dose:  40 mg


Azithromycin 500 mg/ Sodium (Chloride)  250 mls @ 250 mls/hr IVPB DAILY [facility]


   PRN Reason: Protocol


   Last Admin: 03/23/18 10:13 Dose:  250 mls/hr


Lactated Ringer's (Lactated Ringer's)  1,000 mls @ 100 mls/hr IV .Q10H [facility]


   Last Admin: 03/24/18 01:29 Dose:  Not Given


Piperacillin Sod/Tazobactam Sod (Zosyn 3.375 Gm Iv Premix)  3.375 gm in 50 mls 

@ 200 mls/hr IVPB Q6H [facility]


   PRN Reason: Protocol


   Last Admin: 03/24/18 06:34 Dose:  200 mls/hr


Levetiracetam (Keppra)  750 mg PO Q12H [facility]


   Last Admin: 03/23/18 21:32 Dose:  750 mg


Morphine Sulfate (Morphine)  2 mg IVP Q4 PRN


   PRN Reason: Pain, moderate (4-7)


   Last Admin: 03/23/18 21:48 Dose:  2 mg


Saccharomyces Boulardii (Florastor)  250 mg PO BID [facility]


   Last Admin: 03/23/18 17:47 Dose:  250 mg











- Labs


Labs: 


 





 03/24/18 06:15 





 03/24/18 06:14 











- Constitutional


Appears: Non-toxic, No Acute Distress





- Head Exam


Head Exam: ATRAUMATIC, NORMAL INSPECTION





- Eye Exam


Eye Exam: EOMI


Pupil Exam: PERRL





- ENT Exam


ENT Exam: Mucous Membranes Moist





- Respiratory Exam


Respiratory Exam: Clear to Ausculation Bilateral, NORMAL BREATHING PATTERN





- Cardiovascular Exam


Cardiovascular Exam: REGULAR RHYTHM, +S1, +S2





- GI/Abdominal Exam


GI & Abdominal Exam: Soft, Normal Bowel Sounds.  absent: Tenderness





- Extremities Exam


Extremities Exam: Normal Inspection.  absent: Pedal Edema


Additional comments: 





Rt arm in sling





- Back Exam


Back Exam: absent: CVA tenderness (L), CVA tenderness (R)





- Neurological Exam


Neurological Exam: Alert, Awake, Oriented x3





- Psychiatric Exam


Psychiatric exam: Normal Affect, Normal Mood





- Skin


Skin Exam: Normal Color, Warm





Assessment and Plan





- Assessment and Plan (Free Text)


Plan: 








Sepsis 


Secondary to RLL Pneumonia


CT Chest 3/21/18 showed evidence of RLL


Azithromycin 500 mg IV 1x/day (3/21/18)


Ceftriaxone 2 gm IV Q24H (3/21/18)


Blood Culture is negative to date


Urine Culture shows NO growth


F/U Mycoplasma IgG, IgM


F/U Urine Strep Ag


Urine Legionella is negative


Rapid Influenza is negative





Seizure


Keppra 500 mg PO 2x/day


CT Head 3/21/18: NO acute findings


Brain MRI 3/21/18: normal pre and post contrast enhanced brain


MRA Brain 3/22/18 shows borderline fibromuscular dysplasia pattern involving 

the right > left, bilateral infundibuli are favored over small bilateral 

opthalmic artery aneurysms


CTA Head and Neck (3/23/18): Normal CTA. No evidence of ophthalmic artery 

aneurysms


Neurology Dr. Marcano





Right Shoulder Dislocation/Right Humerus Fracture


Reduced in ER at the time of admission


Right Shoulder CT 3/22/18 shows communuted displaced greater tuberosity fracture

, successful closed reduction of anterior glenohumeral dislocation


Due to RLL Pneumonia, patient is not cleared for surgery requiring general 

anesthesia. She will need to complete antibiotic course and then follow up with 

Dr. Alvarado in his office to schedule surgery.


She will need to keep right shoulder immobilizer on at all times 


Orthopedics Dr. Alvarado





Tongue Laceration


Secondary to the seizure


Lidocaine 2% Viscous 15 ml PO Swish and Spit ACHS





Prophylaxis


Lovenox 40 mg SC 1x/day 


Protonix 40 mg IV 1x/day


Florastor 250 mg PO 2x/day


Regular Diet








<Benton Worrell - Last Filed: 03/24/18 14:41>





Objective





- Vital Signs/Intake and Output


Vital Signs (last 24 hours): 


 











Temp Pulse Resp BP Pulse Ox


 


 98.4 F   60   18   106/66   100 


 


 03/24/18 12:00  03/24/18 10:00  03/24/18 08:00  03/24/18 08:00  03/24/18 08:00








Intake and Output: 


 











 03/24/18 03/24/18





 06:59 18:59


 


Intake Total 100 2200


 


Output Total  600


 


Balance 100 1600














- Medications


Medications: 


 Current Medications





Enoxaparin Sodium (Lovenox)  40 mg SC DAILY [facility]


   Last Admin: 03/24/18 09:10 Dose:  40 mg


Azithromycin 500 mg/ Sodium (Chloride)  250 mls @ 250 mls/hr IVPB DAILY [facility]


   PRN Reason: Protocol


   Last Admin: 03/24/18 09:11 Dose:  250 mls/hr


Lactated Ringer's (Lactated Ringer's)  1,000 mls @ 100 mls/hr IV .Q10H [facility]


   Last Admin: 03/24/18 01:29 Dose:  Not Given


Piperacillin Sod/Tazobactam Sod (Zosyn 3.375 Gm Iv Premix)  3.375 gm in 50 mls 

@ 200 mls/hr IVPB Q6H [facility]


   PRN Reason: Protocol


   Last Admin: 03/24/18 11:42 Dose:  200 mls/hr


Levetiracetam (Keppra)  750 mg PO Q12H [facility]


   Last Admin: 03/24/18 09:10 Dose:  750 mg


Morphine Sulfate (Morphine)  2 mg IVP Q4 PRN


   PRN Reason: Pain, moderate (4-7)


   Last Admin: 03/23/18 21:48 Dose:  2 mg


Saccharomyces Boulardii (Florastor)  250 mg PO BID [facility]


   Last Admin: 03/24/18 09:10 Dose:  250 mg











- Labs


Labs: 


 





 03/24/18 06:15 





 03/24/18 06:14 











Attending/Attestation





- Attestation


I have personally seen and examined this patient.: Yes


I have fully participated in the care of the patient.: Yes


I have reviewed all pertinent clinical information, including history, physical 

exam and plan: Yes


Notes (Text): 





03/24/18 14:34


Hospitalist Progress Note





Patient was seen and examined at 2:20 PM 3/24/18 ICU Bed 18





37 year old female who was admitted morning of 3/21/18 s/p fall and was found 

to have Seizure, Right Shoulder Dislocation, and RLL Pneumonia. Her Right 

Shoulder was reduced in the emergency room and she was then admitted to the ICU 

for further treatment and evaluation.





Upon ROS:


Right Shoulder pain comes and goes but much better than upon presentation


Right lateral tongue pain improving


Moved her bowels and they were normal


NO chest pain


NO SOB/Cough/Wheezing


NO abdominal pain


NO n/v/d/c, NO black or bloody bowel movement


NO other complaints





Exam:


General: AAOX3, NAD


HEENT: NCA, EOMI, PERRLA, NO cervical/supraclavicular/submandibular 

lymphadenopathy, NO pharyngeal erythema/exudate, Nasal Turbinates are 

nonerythematous/nonedematous, Oral Mucosa is moist, Right Lateral Tongue 

Laceration and Left Lateral Tongue Laceration starting to scab over


Cardio: NS1 and NS2, NO M/R/G


Resp: CTA B/L, Faint Bibasilar Rales on exam 


GI: BSx4, Soft, NT, NO HSM, NO guarding/rebound tenderness


Ext: Pulses are strong and equal, Capillary Refill is 2 seconds, NO edema, 

Right Arm in Flexion in Sling


Neuro: CN II through XII are grossly intact





Assessment and Plan:





1). Sepsis Secondary to RLL Pneumonia


CT Chest 3/21/18 showed evidence of RLL


Azithromycin 500 mg IV 1x/day (3/21/18)


Zosyn 3.375 gm IV Q6H (3/23/18)


Blood Culture is negative to date


Urine Culture shows NO growth


F/U Mycoplasma IgG, IgM


F/U Urine Strep Ag


Urine Legionella is negative


Rapid Influenza is negative





Status: Acute





2). Seizure


Keppra 750 mg PO 2x/day


CT Head 3/21/18: NO acute findings


Brain MRI 3/21/18: normal pre and post contrast enhanced brain


MRA Brain 3/22/18 shows borderline fibromuscular dysplasia pattern involving 

the right > left, bilateral infundibuli are favored over small bilateral 

opthalmic artery aneurysms


CTA Head and Neck to rule out ophthalmic artery aneurysms 3/23/18: normal, NO 

evidence of occlusion/deficit/significant stenosis/saccular aneurysm


Neurology Dr. Marcano





Status: Acute





3). Right Shoulder Dislocation/Right Humerus Fracture


Reduced in ER at the time of admission


Right Shoulder CT 3/22/18 shows communuted displaced greater tuberosity fracture

, successful closed reduction of anterior glenohumeral dislocation


Due to RLL Pneumonia, patient is not cleared for surgery requiring general 

anesthesia. She will need to complete antibiotic course and then follow up with 

Dr. Alvarado in his office to schedule surgery.


She will need to keep right shoulder immobilizer on at all times 


Orthopedics Dr. Alvarado





Status: Acute





4). Tongue Laceration


Secondary to the seizure


Lidocaine 2% Viscous 15 ml PO Swish and Spit ACHS


 


Status: Acute





5). Prohylaxis


Lovenox 40 mg SC 1x/day 


Protonix 40 mg IV 1x/day


Florastor 250 mg PO 2x/day


Regular Diet





Status: Acute





HgB/Hct dropped (secondary to the Lovenox? which was discontinued 3/23/18) and 

LFTs elevated (could be secondary to the Ceftriaxone which was discontinued 3/23

/18). Repeat blood work morning of 3/25/28 and if stable will discharge at that 

time.





Benton Worrell D.O.

## 2018-03-25 VITALS — SYSTOLIC BLOOD PRESSURE: 102 MMHG | OXYGEN SATURATION: 100 % | DIASTOLIC BLOOD PRESSURE: 62 MMHG

## 2018-03-25 VITALS — TEMPERATURE: 98.2 F

## 2018-03-25 VITALS — HEART RATE: 78 BPM

## 2018-03-25 LAB
ALBUMIN SERPL-MCNC: 3.9 G/DL (ref 3.5–5)
ALBUMIN/GLOB SERPL: 1.1 {RATIO} (ref 1–2.1)
ALT SERPL-CCNC: 55 U/L (ref 9–52)
AST SERPL-CCNC: 116 U/L (ref 14–36)
BASOPHILS # BLD AUTO: 0.1 K/UL (ref 0–0.2)
BASOPHILS NFR BLD: 0.7 % (ref 0–2)
BUN SERPL-MCNC: 8 MG/DL (ref 7–17)
CALCIUM SERPL-MCNC: 9.1 MG/DL (ref 8.6–10.4)
EOSINOPHIL # BLD AUTO: 0.2 K/UL (ref 0–0.7)
EOSINOPHIL NFR BLD: 2.4 % (ref 0–4)
ERYTHROCYTE [DISTWIDTH] IN BLOOD BY AUTOMATED COUNT: 14.1 % (ref 11.5–14.5)
GFR NON-AFRICAN AMERICAN: > 60
HEPATITIS A IGM: NEGATIVE
HEPATITIS B CORE AB: NEGATIVE
HEPATITIS C ANTIBODY: NEGATIVE
HGB BLD-MCNC: 12.2 G/DL (ref 11–16)
LYMPHOCYTES # BLD AUTO: 1.8 K/UL (ref 1–4.3)
LYMPHOCYTES NFR BLD AUTO: 23.1 % (ref 20–40)
MCH RBC QN AUTO: 29.6 PG (ref 27–31)
MCHC RBC AUTO-ENTMCNC: 33.6 G/DL (ref 33–37)
MCV RBC AUTO: 88 FL (ref 81–99)
MONOCYTES # BLD: 0.5 K/UL (ref 0–0.8)
MONOCYTES NFR BLD: 5.9 % (ref 0–10)
NEUTROPHILS # BLD: 5.4 K/UL (ref 1.8–7)
NEUTROPHILS NFR BLD AUTO: 67.9 % (ref 50–75)
NRBC BLD AUTO-RTO: 0.1 % (ref 0–2)
PLATELET # BLD: 213 K/UL (ref 130–400)
PMV BLD AUTO: 9.5 FL (ref 7.2–11.7)
RBC # BLD AUTO: 4.13 MIL/UL (ref 3.8–5.2)
WBC # BLD AUTO: 7.9 K/UL (ref 4.8–10.8)

## 2018-03-25 RX ADMIN — TAZOBACTAM SODIUM AND PIPERACILLIN SODIUM SCH MLS/HR: 375; 3 INJECTION, SOLUTION INTRAVENOUS at 05:50

## 2018-03-25 RX ADMIN — Medication SCH MG: at 10:00

## 2018-03-25 NOTE — CP.PCM.PN
Subjective





- Date & Time of Evaluation


Date of Evaluation: 03/25/18


Time of Evaluation: 07:06





- Subjective


Subjective: 





Ms. Patterson was seen and examined at the bedside at the bedside in ICU.  She is 

alert, oriented in all spheres. She denies any headache, dizziness, 

lightheadedness, blurred vision, diplopia, or any seizure-like activity. She is 

able to follow simple commands with right arm with minimal movement due to 

fracture and left arm due to swelling. She is able to move bilateral lower 

extremities spontaneously. CTA of the head and neck showed normal CTA with no 

evidence of occlusion, definite significant stenosis, or saccular aneurysm. 

There was no untoward events overnight





Objective





- Vital Signs/Intake and Output


Vital Signs (last 24 hours): 


 











Temp Pulse Resp BP Pulse Ox


 


 97.9 F   80   18   102/62   100 


 


 03/25/18 04:00  03/25/18 04:00  03/25/18 04:00  03/25/18 04:00  03/25/18 04:00








Intake and Output: 


 











 03/25/18 03/25/18





 06:59 18:59


 


Intake Total 100 


 


Output Total 600 


 


Balance -500 














- Medications


Medications: 


 Current Medications





Enoxaparin Sodium (Lovenox)  40 mg SC DAILY [facility]


   Last Admin: 03/24/18 09:10 Dose:  40 mg


Azithromycin 500 mg/ Sodium (Chloride)  250 mls @ 250 mls/hr IVPB DAILY [facility]


   PRN Reason: Protocol


   Last Admin: 03/24/18 09:11 Dose:  250 mls/hr


Piperacillin Sod/Tazobactam Sod (Zosyn 3.375 Gm Iv Premix)  3.375 gm in 50 mls 

@ 200 mls/hr IVPB Q6H [facility]


   PRN Reason: Protocol


   Last Admin: 03/25/18 05:50 Dose:  200 mls/hr


Levetiracetam (Keppra)  750 mg PO Q12H [facility]


   Last Admin: 03/24/18 21:45 Dose:  750 mg


Morphine Sulfate (Morphine)  2 mg IVP Q4 PRN


   PRN Reason: Pain, moderate (4-7)


   Last Admin: 03/25/18 06:19 Dose:  2 mg


Saccharomyces Boulardii (Florastor)  250 mg PO BID [facility]


   Last Admin: 03/24/18 17:04 Dose:  250 mg











- Labs


Labs: 


 





 03/25/18 06:32 





 03/25/18 06:31 











- Constitutional


Appears: No Acute Distress





- Head Exam


Head Exam: NORMAL INSPECTION





- Neurological Exam


Neurological Exam: Alert, Awake, Oriented x3


Neuro motor strength exam: Left Upper Extremity: 4, Right Upper Extremity: 2/1, 

Left Lower Extremity: 5, Right Lower Extremity: 5


Additional comments: 





Neurological unchanged from previous examination.





Assessment and Plan


(1) Seizures


Assessment & Plan: 


Case discussed with Dr. Zhang, continue all current medical regimen. Recommend 

to treat any underlying electrolyte abnormalities including liver enzymes. 

Pending EEG. If patient will be discharge, to follow up with Dr. Zhang at [address] [place] [address]. [address] [facility] [address], tel. # [phone].


Status: Acute

## 2018-03-26 NOTE — PCM.EEG
Electroencephalogram Report





- Electroencephalogram Report


Procedure Date: 03/22/18


Interpretation: Normal awake and sleep eeg no seizures.


Impression: normal